# Patient Record
Sex: FEMALE | Race: BLACK OR AFRICAN AMERICAN | NOT HISPANIC OR LATINO | Employment: OTHER | URBAN - METROPOLITAN AREA
[De-identification: names, ages, dates, MRNs, and addresses within clinical notes are randomized per-mention and may not be internally consistent; named-entity substitution may affect disease eponyms.]

---

## 2020-05-25 LAB — EXT SARS-COV-2: NOT DETECTED

## 2020-08-06 ENCOUNTER — OFFICE VISIT (OUTPATIENT)
Dept: OBGYN CLINIC | Facility: CLINIC | Age: 85
End: 2020-08-06
Payer: MEDICARE

## 2020-08-06 VITALS
SYSTOLIC BLOOD PRESSURE: 147 MMHG | DIASTOLIC BLOOD PRESSURE: 73 MMHG | HEIGHT: 67 IN | TEMPERATURE: 96.8 F | HEART RATE: 67 BPM

## 2020-08-06 DIAGNOSIS — M22.2X2 PATELLOFEMORAL SYNDROME OF BOTH KNEES: ICD-10-CM

## 2020-08-06 DIAGNOSIS — M22.2X1 PATELLOFEMORAL SYNDROME OF BOTH KNEES: ICD-10-CM

## 2020-08-06 DIAGNOSIS — M17.0 PRIMARY OSTEOARTHRITIS OF BOTH KNEES: Primary | ICD-10-CM

## 2020-08-06 DIAGNOSIS — R29.898 WEAKNESS OF BOTH HIPS: ICD-10-CM

## 2020-08-06 PROCEDURE — 99204 OFFICE O/P NEW MOD 45 MIN: CPT | Performed by: FAMILY MEDICINE

## 2020-08-06 PROCEDURE — 20610 DRAIN/INJ JOINT/BURSA W/O US: CPT | Performed by: FAMILY MEDICINE

## 2020-08-06 RX ORDER — LIDOCAINE HYDROCHLORIDE 10 MG/ML
2 INJECTION, SOLUTION INFILTRATION; PERINEURAL
Status: COMPLETED | OUTPATIENT
Start: 2020-08-06 | End: 2020-08-06

## 2020-08-06 RX ORDER — LEVOTHYROXINE SODIUM 0.07 MG/1
75 TABLET ORAL
COMMUNITY
Start: 2020-07-11 | End: 2022-05-28

## 2020-08-06 RX ORDER — RISEDRONATE SODIUM 35 MG/1
35 TABLET, FILM COATED ORAL
COMMUNITY
Start: 2020-07-17

## 2020-08-06 RX ORDER — LOVASTATIN 20 MG/1
20 TABLET ORAL
COMMUNITY
Start: 2020-07-11 | End: 2022-05-28

## 2020-08-06 RX ORDER — AMLODIPINE BESYLATE 10 MG/1
10 TABLET ORAL
COMMUNITY
Start: 2020-07-23

## 2020-08-06 RX ORDER — TRIAMCINOLONE ACETONIDE 40 MG/ML
40 INJECTION, SUSPENSION INTRA-ARTICULAR; INTRAMUSCULAR
Status: COMPLETED | OUTPATIENT
Start: 2020-08-06 | End: 2020-08-06

## 2020-08-06 RX ORDER — LABETALOL 100 MG/1
0.5 TABLET, FILM COATED ORAL
Status: ON HOLD | COMMUNITY
Start: 2020-07-11 | End: 2022-05-28 | Stop reason: SDUPTHER

## 2020-08-06 RX ORDER — LIDOCAINE HYDROCHLORIDE 10 MG/ML
4 INJECTION, SOLUTION INFILTRATION; PERINEURAL
Status: COMPLETED | OUTPATIENT
Start: 2020-08-06 | End: 2020-08-06

## 2020-08-06 RX ORDER — MIRABEGRON 25 MG/1
25 TABLET, FILM COATED, EXTENDED RELEASE ORAL
COMMUNITY
Start: 2020-07-27

## 2020-08-06 RX ORDER — FOLIC ACID 1 MG/1
1 TABLET ORAL
COMMUNITY
Start: 2020-07-23

## 2020-08-06 RX ORDER — TRAMADOL HYDROCHLORIDE 50 MG/1
50 TABLET ORAL
Status: ON HOLD | COMMUNITY
Start: 2020-07-20 | End: 2022-05-28 | Stop reason: SDUPTHER

## 2020-08-06 RX ORDER — MELOXICAM 7.5 MG/1
7.5 TABLET ORAL
COMMUNITY
Start: 2020-07-23

## 2020-08-06 RX ORDER — QUINAPRIL 40 MG/1
40 TABLET ORAL
COMMUNITY
Start: 2020-07-23

## 2020-08-06 RX ADMIN — LIDOCAINE HYDROCHLORIDE 2 ML: 10 INJECTION, SOLUTION INFILTRATION; PERINEURAL at 14:01

## 2020-08-06 RX ADMIN — LIDOCAINE HYDROCHLORIDE 4 ML: 10 INJECTION, SOLUTION INFILTRATION; PERINEURAL at 14:01

## 2020-08-06 RX ADMIN — TRIAMCINOLONE ACETONIDE 40 MG: 40 INJECTION, SUSPENSION INTRA-ARTICULAR; INTRAMUSCULAR at 14:01

## 2020-08-06 NOTE — LETTER
August 6, 2020     [facility] [address]    Patient: Kristyn Stevens   YOB: 1933   Date of Visit: 8/6/2020       Dear Dr Mccollum Ba:    Thank you for referring Kristyn Stevens to me for evaluation  Below are my notes for this consultation  If you have questions, please do not hesitate to call me  I look forward to following your patient along with you  Sincerely,        [employer], DO        CC: No Recipients  [employer], DO  8/6/2020  5:10 PM  Sign when Signing Visit  Assessment/Plan:  Assessment/Plan   Diagnoses and all orders for this visit:    Primary osteoarthritis of both knees  -     Large joint arthrocentesis: bilateral knee    Patellofemoral syndrome of both knees    Weakness of both hips    Other orders  -     amLODIPine (NORVASC) 10 mg tablet; 10 mg  -     Calcium Carbonate-Vitamin D 600-400 MG-UNIT per tablet; 600 tablets  -     folic acid (FOLVITE) 1 mg tablet; 1 mg  -     labetalol (NORMODYNE) 100 mg tablet; 0 5 mg  -     levothyroxine 75 mcg tablet; 75 mcg  -     lovastatin (MEVACOR) 20 mg tablet; 20 mg  -     meloxicam (MOBIC) 7 5 mg tablet; 7 5 mg  -     Myrbetriq 25 MG TB24; 25 mg  -     quinapril (ACCUPRIL) 40 MG tablet; 40 mg  -     risedronate (ACTONEL) 35 mg tablet; 35 mg  -     traMADol (ULTRAM) 50 mg tablet; 48mg      80year-old female with osteoarthritis of both knees with bilateral knee pain more than 5 years duration  Discussed with patient and accompanying aid physical exam, radiographs, impression and plan  X-rays of both knees noted for severe tricompartmental osteoarthritis  Physical exam noted for joint hypertrophy both knees  She has genu valgum deformity  There is mild tenderness of the medial and lateral joint lines  She has extension limited to -20° and flexion to 90° both knees  Clinical impression that she is symptomatic from osteoarthritis    She has been on meloxicam 7 5 mg once daily and also doing formal physical therapy  I discussed treatment regimen of corticosteroid injection to which she agreed  I administered mixture of 4 cc 1% lidocaine and 1 cc Kenalog to each knee without complication  I recommend she continue with physical therapy and home exercises  She will follow up with me in 1 month via virtual visit  If no improvement with corticosteroid injections we will try viscosupplementation  Subjective:   Patient ID: Lesley Sagastume is a 80 y o  female  Chief Complaint   Patient presents with    Left Knee - Pain    Right Knee - Pain       26-year-old female with osteoarthritis of both knees is accompanied by aide from [facility] for evaluation of bilateral knee pain of more than 5 years duration  He reports having pain described as generalized to the knees but worse on the right, intermittent, nonradiating, worse with bending and extending the leg, worse with standing and ambulating, associated with swelling, and improved rest   She reports being unable to stand or ambulate due to pain  She has been in wheelchair for few years  She does attend physical therapy at her facility  She has been on meloxicam 7 5 mg once daily  Knee Pain   This is a chronic problem  The current episode started more than 1 year ago  The problem occurs intermittently  The problem has been unchanged  Associated symptoms include arthralgias, joint swelling, numbness and weakness  Pertinent negatives include no abdominal pain, chest pain, chills, fever, rash or sore throat  The symptoms are aggravated by standing and walking  She has tried rest, NSAIDs and acetaminophen (Physical therapy) for the symptoms  The treatment provided mild relief  The following portions of the patient's history were reviewed and updated as appropriate: She  has a past medical history of Hemiplegia ([address] ), Hyperlipidemia, Hypothyroidism, Kidney disorder, Osteoarthritis, Schizophrenia ([address] ), and Vascular disease    She has a past surgical history that includes Cataract extraction and Fracture surgery  Her family history includes No Known Problems in her father and mother  She  has no history on file for tobacco, alcohol, and drug  She has No Known Allergies       Review of Systems   Constitutional: Negative for chills and fever  HENT: Negative for sore throat  Eyes: Negative for visual disturbance  Respiratory: Negative for shortness of breath  Cardiovascular: Negative for chest pain  Gastrointestinal: Negative for abdominal pain  Genitourinary: Negative for flank pain  Musculoskeletal: Positive for arthralgias and joint swelling  Skin: Negative for rash and wound  Neurological: Positive for weakness and numbness  Hematological: Does not bruise/bleed easily  Psychiatric/Behavioral: Negative for self-injury  Objective:  Vitals:    08/06/20 1329   BP: 147/73   Pulse: 67   Temp: (!) 96 8 °F (36 °C)   Height: 5' 6 5" (1 689 m)     Right Knee Exam     Tenderness   The patient is experiencing tenderness in the lateral joint line and medial joint line  Range of Motion   Extension: -20   Flexion: 90     Other   Swelling: moderate    Comments:  Crepitus      Left Knee Exam     Tenderness   The patient is experiencing tenderness in the lateral joint line and medial joint line  Range of Motion   Extension: -20   Flexion: 100     Other   Swelling: moderate    Comments:  Crepitus      Right Hip Exam     Muscle Strength   Abduction: 4/5   Flexion: 4/5       Left Hip Exam     Muscle Strength   Abduction: 4/5   Flexion: 4/5       Back Exam     Muscle Strength   Right Quadriceps:  4/5   Left Quadriceps:  4/5             Physical Exam   Constitutional: She appears well-developed  Non-toxic appearance  No distress  HENT:   Head: Normocephalic  Mouth/Throat: Mucous membranes are moist    Eyes: Conjunctivae are normal    Neck: No tracheal deviation present  Cardiovascular: Normal rate     Pulmonary/Chest: Effort normal  No respiratory distress  Abdominal: She exhibits no distension  Musculoskeletal:         General: Swelling, tenderness and deformity present  No signs of injury  Neurological: She is alert  She displays weakness  A sensory deficit is present  Coordination normal    Skin: Skin is warm and dry  No bruising noted  She is not diaphoretic  No erythema  Psychiatric: Her behavior is normal    Nursing note and vitals reviewed  I have personally reviewed pertinent films in PACS and my interpretation is Severe tricompartmental osteoarthritis of both knees      Large joint arthrocentesis: bilateral knee  Date/Time: 8/6/2020 2:01 PM  Consent given by: patient  Site marked: site marked  Timeout: Immediately prior to procedure a time out was called to verify the correct patient, procedure, equipment, support staff and site/side marked as required   Supporting Documentation  Indications: pain   Procedure Details  Location: knee - bilateral knee  Preparation: Patient was prepped and draped in the usual sterile fashion  Needle size: 22 G  Ultrasound guidance: no  Approach: anterolateral    Medications (Right): 2 mL lidocaine 1 %; 4 mL lidocaine 1 %; 40 mg triamcinolone acetonide 40 mg/mLMedications (Left): 2 mL lidocaine 1 %; 4 mL lidocaine 1 %; 40 mg triamcinolone acetonide 40 mg/mL   Patient tolerance: patient tolerated the procedure well with no immediate complications  Dressing:  Sterile dressing applied

## 2020-08-06 NOTE — PROGRESS NOTES
Assessment/Plan:  Assessment/Plan   Diagnoses and all orders for this visit:    Primary osteoarthritis of both knees  -     Large joint arthrocentesis: bilateral knee    Patellofemoral syndrome of both knees    Weakness of both hips    Other orders  -     amLODIPine (NORVASC) 10 mg tablet; 10 mg  -     Calcium Carbonate-Vitamin D 600-400 MG-UNIT per tablet; 600 tablets  -     folic acid (FOLVITE) 1 mg tablet; 1 mg  -     labetalol (NORMODYNE) 100 mg tablet; 0 5 mg  -     levothyroxine 75 mcg tablet; 75 mcg  -     lovastatin (MEVACOR) 20 mg tablet; 20 mg  -     meloxicam (MOBIC) 7 5 mg tablet; 7 5 mg  -     Myrbetriq 25 MG TB24; 25 mg  -     quinapril (ACCUPRIL) 40 MG tablet; 40 mg  -     risedronate (ACTONEL) 35 mg tablet; 35 mg  -     traMADol (ULTRAM) 50 mg tablet; 48mg      80year-old female with osteoarthritis of both knees with bilateral knee pain more than 5 years duration  Discussed with patient and accompanying aid physical exam, radiographs, impression and plan  X-rays of both knees noted for severe tricompartmental osteoarthritis  Physical exam noted for joint hypertrophy both knees  She has genu valgum deformity  There is mild tenderness of the medial and lateral joint lines  She has extension limited to -20° and flexion to 90° both knees  Clinical impression that she is symptomatic from osteoarthritis  She has been on meloxicam 7 5 mg once daily and also doing formal physical therapy  I discussed treatment regimen of corticosteroid injection to which she agreed  I administered mixture of 4 cc 1% lidocaine and 1 cc Kenalog to each knee without complication  I recommend she continue with physical therapy and home exercises  She will follow up with me in 1 month via virtual visit  If no improvement with corticosteroid injections we will try viscosupplementation  Subjective:   Patient ID: Adrián Panda is a 80 y o  female    Chief Complaint   Patient presents with    Left Knee - Pain    Right Knee - Pain       5-year-old female with osteoarthritis of both knees is accompanied by aide from [facility] for evaluation of bilateral knee pain of more than 5 years duration  He reports having pain described as generalized to the knees but worse on the right, intermittent, nonradiating, worse with bending and extending the leg, worse with standing and ambulating, associated with swelling, and improved rest   She reports being unable to stand or ambulate due to pain  She has been in wheelchair for few years  She does attend physical therapy at her facility  She has been on meloxicam 7 5 mg once daily  Knee Pain   This is a chronic problem  The current episode started more than 1 year ago  The problem occurs intermittently  The problem has been unchanged  Associated symptoms include arthralgias, joint swelling, numbness and weakness  Pertinent negatives include no abdominal pain, chest pain, chills, fever, rash or sore throat  The symptoms are aggravated by standing and walking  She has tried rest, NSAIDs and acetaminophen (Physical therapy) for the symptoms  The treatment provided mild relief  The following portions of the patient's history were reviewed and updated as appropriate: She  has a past medical history of Hemiplegia ([address] ), Hyperlipidemia, Hypothyroidism, Kidney disorder, Osteoarthritis, Schizophrenia ([address] ), and Vascular disease  She  has a past surgical history that includes Cataract extraction and Fracture surgery  Her family history includes No Known Problems in her father and mother  She  has no history on file for tobacco, alcohol, and drug  She has No Known Allergies       Review of Systems   Constitutional: Negative for chills and fever  HENT: Negative for sore throat  Eyes: Negative for visual disturbance  Respiratory: Negative for shortness of breath  Cardiovascular: Negative for chest pain  Gastrointestinal: Negative for abdominal pain  Genitourinary: Negative for flank pain  Musculoskeletal: Positive for arthralgias and joint swelling  Skin: Negative for rash and wound  Neurological: Positive for weakness and numbness  Hematological: Does not bruise/bleed easily  Psychiatric/Behavioral: Negative for self-injury  Objective:  Vitals:    08/06/20 1329   BP: 147/73   Pulse: 67   Temp: (!) 96 8 °F (36 °C)   Height: 5' 6 5" (1 689 m)     Right Knee Exam     Tenderness   The patient is experiencing tenderness in the lateral joint line and medial joint line  Range of Motion   Extension: -20   Flexion: 90     Other   Swelling: moderate    Comments:  Crepitus      Left Knee Exam     Tenderness   The patient is experiencing tenderness in the lateral joint line and medial joint line  Range of Motion   Extension: -20   Flexion: 100     Other   Swelling: moderate    Comments:  Crepitus      Right Hip Exam     Muscle Strength   Abduction: 4/5   Flexion: 4/5       Left Hip Exam     Muscle Strength   Abduction: 4/5   Flexion: 4/5       Back Exam     Muscle Strength   Right Quadriceps:  4/5   Left Quadriceps:  4/5             Physical Exam   Constitutional: She appears well-developed  Non-toxic appearance  No distress  HENT:   Head: Normocephalic  Mouth/Throat: Mucous membranes are moist    Eyes: Conjunctivae are normal    Neck: No tracheal deviation present  Cardiovascular: Normal rate  Pulmonary/Chest: Effort normal  No respiratory distress  Abdominal: She exhibits no distension  Musculoskeletal:         General: Swelling, tenderness and deformity present  No signs of injury  Neurological: She is alert  She displays weakness  A sensory deficit is present  Coordination normal    Skin: Skin is warm and dry  No bruising noted  She is not diaphoretic  No erythema  Psychiatric: Her behavior is normal    Nursing note and vitals reviewed        I have personally reviewed pertinent films in PACS and my interpretation is Severe tricompartmental osteoarthritis of both knees      Large joint arthrocentesis: bilateral knee  Date/Time: 8/6/2020 2:01 PM  Consent given by: patient  Site marked: site marked  Timeout: Immediately prior to procedure a time out was called to verify the correct patient, procedure, equipment, support staff and site/side marked as required   Supporting Documentation  Indications: pain   Procedure Details  Location: knee - bilateral knee  Preparation: Patient was prepped and draped in the usual sterile fashion  Needle size: 22 G  Ultrasound guidance: no  Approach: anterolateral    Medications (Right): 2 mL lidocaine 1 %; 4 mL lidocaine 1 %; 40 mg triamcinolone acetonide 40 mg/mLMedications (Left): 2 mL lidocaine 1 %; 4 mL lidocaine 1 %; 40 mg triamcinolone acetonide 40 mg/mL   Patient tolerance: patient tolerated the procedure well with no immediate complications  Dressing:  Sterile dressing applied

## 2020-09-23 ENCOUNTER — OFFICE VISIT (OUTPATIENT)
Dept: OBGYN CLINIC | Facility: CLINIC | Age: 85
End: 2020-09-23
Payer: MEDICARE

## 2020-09-23 VITALS
WEIGHT: 172 LBS | BODY MASS INDEX: 27 KG/M2 | DIASTOLIC BLOOD PRESSURE: 71 MMHG | HEIGHT: 67 IN | SYSTOLIC BLOOD PRESSURE: 155 MMHG | HEART RATE: 66 BPM | TEMPERATURE: 97.8 F

## 2020-09-23 DIAGNOSIS — M17.0 PRIMARY OSTEOARTHRITIS OF BOTH KNEES: Primary | ICD-10-CM

## 2020-09-23 PROCEDURE — 99213 OFFICE O/P EST LOW 20 MIN: CPT | Performed by: FAMILY MEDICINE

## 2020-09-23 RX ORDER — MELOXICAM 15 MG/1
15 TABLET ORAL DAILY
Qty: 30 TABLET | Refills: 2 | Status: SHIPPED | OUTPATIENT
Start: 2020-09-23

## 2020-09-23 NOTE — PROGRESS NOTES
Assessment/Plan:  Assessment/Plan   Diagnoses and all orders for this visit:    Primary osteoarthritis of both knees  -     Injection procedure prior authorization; Future  -     meloxicam (MOBIC) 15 mg tablet; Take 1 tablet (15 mg total) by mouth daily         80year-old female osteoarthritis of both knees with bilateral knee pain of more than 5 years duration  Discussed with patient  And accompanying aid physical exam, impression, plan  Physical exam is noted for joint hypertrophy changes of both knees  She has mild tenderness at the medial and lateral joint lines of both knees  She has range of motion limited extension of -10 and flexion to 90 bilaterally  There is palpable crepitus with range of motion  Clinical impression is that she is symptomatic from osteoarthritis  She has not improved despite physical therapy, corticosteroid injection, and being on prescribed anti-inflammatory  I discussed treatment in the form of viscosupplementation to which she agreed  We will submit request for one-shot Visco supplement for each knee  She is advised to continue with physical therapy home exercises  She is also to increase meloxicam 7 5 mg once daily to meloxicam 15 mg once daily with food  She will return for injections once approved  Subjective:   Patient ID: Emily Lo is a 80 y o  female  Chief Complaint   Patient presents with    Left Knee - Pain    Right Knee - Pain        27-year-old female with osteoarthritis of both knees following up for bilateral knee pain of more than 5 years duration  She was last seen by me more than 6 weeks ago which point she received corticosteroid injection to each knee  She reports that corticosteroid injections provided mild temporary improvement in her pain    She reports still having pain described as generalized to the knees, intermittent, achy and sometimes sharp, nonradiating, worse with standing and ambulating, associated with limited range of motion, and improved with resting  She spends most of her time in the wheelchair  She has been having physical therapy on a daily basis  Her aid does report that she is able to stand with the aid of a walker to self transfer  She has been taking meloxicam 7 5 mg once daily  Knee Pain   This is a chronic problem  The current episode started more than 1 year ago  The problem occurs daily  The problem has been unchanged  Associated symptoms include arthralgias and joint swelling  Pertinent negatives include no numbness or weakness  The symptoms are aggravated by standing and walking  She has tried rest and NSAIDs (  Physical therapy, corticosteroid injection) for the symptoms  The treatment provided mild relief  Review of Systems   Musculoskeletal: Positive for arthralgias and joint swelling  Neurological: Negative for weakness and numbness  Objective:  Vitals:    09/23/20 0830   BP: 155/71   Pulse: 66   Temp: 97 8 °F (36 6 °C)   Weight: 78 kg (172 lb)   Height: 5' 6 5" (1 689 m)     Right Knee Exam     Tenderness   The patient is experiencing tenderness in the medial joint line  Range of Motion   Extension: -10   Flexion: 90     Other   Swelling: mild      Left Knee Exam     Tenderness   The patient is experiencing tenderness in the medial joint line  Range of Motion   Extension: -10   Flexion: 90     Other   Swelling: mild      Right Hip Exam     Muscle Strength   Flexion: 4/5       Left Hip Exam     Muscle Strength   Flexion: 4/5             Physical Exam  Vitals signs and nursing note reviewed  Constitutional:       General: She is not in acute distress  Appearance: She is well-developed  HENT:      Head: Normocephalic  Eyes:      Conjunctiva/sclera: Conjunctivae normal    Neck:      Trachea: No tracheal deviation  Cardiovascular:      Rate and Rhythm: Normal rate  Pulmonary:      Effort: Pulmonary effort is normal  No respiratory distress     Abdominal:      General: There is no distension  Musculoskeletal:         General: Swelling and tenderness present  Skin:     General: Skin is warm and dry  Neurological:      Mental Status: She is alert  Mental status is at baseline     Psychiatric:         Behavior: Behavior normal

## 2020-11-03 ENCOUNTER — OFFICE VISIT (OUTPATIENT)
Dept: OBGYN CLINIC | Facility: CLINIC | Age: 85
End: 2020-11-03
Payer: MEDICARE

## 2020-11-03 VITALS
HEIGHT: 67 IN | SYSTOLIC BLOOD PRESSURE: 136 MMHG | BODY MASS INDEX: 29.03 KG/M2 | DIASTOLIC BLOOD PRESSURE: 78 MMHG | TEMPERATURE: 98.7 F | WEIGHT: 185 LBS | HEART RATE: 80 BPM

## 2020-11-03 DIAGNOSIS — M17.0 PRIMARY OSTEOARTHRITIS OF BOTH KNEES: Primary | ICD-10-CM

## 2020-11-03 PROCEDURE — 20610 DRAIN/INJ JOINT/BURSA W/O US: CPT | Performed by: FAMILY MEDICINE

## 2020-11-03 RX ORDER — SERTRALINE HYDROCHLORIDE 25 MG/1
25 TABLET, FILM COATED ORAL DAILY
COMMUNITY
End: 2022-05-28

## 2020-11-03 RX ORDER — LIDOCAINE HYDROCHLORIDE 10 MG/ML
3 INJECTION, SOLUTION INFILTRATION; PERINEURAL
Status: COMPLETED | OUTPATIENT
Start: 2020-11-03 | End: 2020-11-03

## 2020-11-03 RX ADMIN — LIDOCAINE HYDROCHLORIDE 3 ML: 10 INJECTION, SOLUTION INFILTRATION; PERINEURAL at 13:50

## 2021-07-29 ENCOUNTER — HOSPITAL ENCOUNTER (OUTPATIENT)
Dept: NON INVASIVE DIAGNOSTICS | Facility: HOSPITAL | Age: 86
Discharge: HOME | End: 2021-07-29
Payer: MEDICARE

## 2021-07-29 DIAGNOSIS — M54.81 OCCIPITAL NEURALGIA, UNSPECIFIED LATERALITY: ICD-10-CM

## 2021-07-29 PROCEDURE — 93886 INTRACRANIAL COMPLETE STUDY: CPT | Performed by: SURGERY

## 2021-07-29 PROCEDURE — 93882 EXTRACRANIAL UNI/LTD STUDY: CPT

## 2021-10-09 DIAGNOSIS — R10.2 PAIN IN PELVIS: Primary | ICD-10-CM

## 2021-10-12 ENCOUNTER — APPOINTMENT (OUTPATIENT)
Dept: RADIOLOGY | Facility: CLINIC | Age: 86
End: 2021-10-12
Payer: MEDICARE

## 2021-10-12 ENCOUNTER — OFFICE VISIT (OUTPATIENT)
Dept: OBGYN CLINIC | Facility: CLINIC | Age: 86
End: 2021-10-12
Payer: MEDICARE

## 2021-10-12 VITALS
DIASTOLIC BLOOD PRESSURE: 81 MMHG | HEART RATE: 76 BPM | SYSTOLIC BLOOD PRESSURE: 146 MMHG | WEIGHT: 185 LBS | BODY MASS INDEX: 29.03 KG/M2 | HEIGHT: 67 IN

## 2021-10-12 DIAGNOSIS — R10.2 PAIN IN PELVIS: ICD-10-CM

## 2021-10-12 DIAGNOSIS — S32.82XD MULTIPLE CLOSED STABLE LATERAL COMPRESSION FRACTURES OF PELVIS WITH ROUTINE HEALING, SUBSEQUENT ENCOUNTER: Primary | ICD-10-CM

## 2021-10-12 PROCEDURE — 99213 OFFICE O/P EST LOW 20 MIN: CPT | Performed by: ORTHOPAEDIC SURGERY

## 2021-10-12 PROCEDURE — 72190 X-RAY EXAM OF PELVIS: CPT

## 2021-10-13 PROBLEM — S32.82XA: Status: ACTIVE | Noted: 2021-10-13

## 2021-12-06 ENCOUNTER — HOSPITAL ENCOUNTER (OUTPATIENT)
Dept: MRI IMAGING | Facility: CLINIC | Age: 86
Discharge: HOME | End: 2021-12-06

## 2021-12-06 DIAGNOSIS — M79.2 NEURALGIA: ICD-10-CM

## 2021-12-22 ENCOUNTER — HOSPITAL ENCOUNTER (OUTPATIENT)
Dept: MRI IMAGING | Facility: HOSPITAL | Age: 86
Discharge: HOME | End: 2021-12-22
Attending: PSYCHIATRY & NEUROLOGY
Payer: MEDICARE

## 2021-12-22 PROCEDURE — A9585 GADOBUTROL INJECTION: HCPCS | Performed by: PSYCHIATRY & NEUROLOGY

## 2021-12-22 PROCEDURE — 70553 MRI BRAIN STEM W/O & W/DYE: CPT

## 2021-12-22 RX ADMIN — GADOBUTROL 8 ML: 604.72 INJECTION INTRAVENOUS at 19:04

## 2022-01-10 NOTE — PROGRESS NOTES
Assessment and Plan:   Patient is an 75-year-old female who presents for rheumatology consult regarding concern for giant cell arteritis  Patient is unable to provide much history today and provides very limited information as to why she is coming in today  Chart review reveals that about 6 months ago there was concern for giant cell arteritis as she was complaining of neck pain and pain in her temples  At that time she also did have a sed rate of 99 but with a normal CRP  She did subsequently have temporal artery Doppler done which was normal   Today she reports that she was having pain in her temples along with neck pain that is most prominent on the right side at this time  Review of systems is otherwise negative including for vision changes such as sudden vision loss or symptoms suggestive of jaw claudication  I discussed with her and the staff member present with her that the next step would be to repeat these inflammatory markers at this time along with some other rheumatologic labs  If her sed rate is still markedly elevated then the next step would be temporal artery biopsy  I did mention this to her and she seemed to understand  However we will obtain the labs 1st and determine next steps from there      Plan:  Diagnoses and all orders for this visit:    Elevated sed rate  -     Quantiferon TB Gold Plus  -     Chronic Hepatitis Panel  -     C-reactive protein  -     Sedimentation rate, automated  -     RF Screen w/ Reflex to Titer  -     Cyclic citrul peptide antibody, IgG  -     CBC    Temple tenderness  -     Quantiferon TB Gold Plus  -     Chronic Hepatitis Panel  -     C-reactive protein  -     Sedimentation rate, automated  -     RF Screen w/ Reflex to Titer  -     Cyclic citrul peptide antibody, IgG  -     CBC    Encounter for screening for other viral diseases   -     Chronic Hepatitis Panel    Other orders  -     alendronate (FOSAMAX) 70 mg tablet; alendronate 70 mg tablet  -     clopidogrel (PLAVIX) 75 mg tablet  -     Diclofenac Sodium (Voltaren) 1 %; Voltaren 1 % topical gel  -     FeroSul 325 (65 Fe) MG tablet  -     gabapentin (NEURONTIN) 100 mg capsule  -     gabapentin (NEURONTIN) 300 mg capsule  -     OLANZapine (ZyPREXA) 10 mg tablet; olanzapine 10 mg tablet  -     pravastatin (PRAVACHOL) 20 mg tablet; pravastatin 20 mg tablet  -     predniSONE 1 mg tablet  -     predniSONE 5 mg tablet        Follow-up plan: depending on work-up      HPI  Pavan Weir is a 80 y o   female with hypertension, hyperlipidemia, hypothyroidism, osteoporosis, depression, schizophrenia, h/o CVA with hemiplegia, OA, L inferior and superior pubic ramus fracture 9/2021, who presents for rheumatology consult by request of Dr Tye Sandifer for scalp tenderness, concern for GCA  Patient presents today from [facility]  She presents with 1 of the staff members  Patient is not entirely sure why she is here today and provides very limited information and history  Per chart review, there was concern about 6 months ago for giant cell arteritis as patient was complaining of headaches and neck pain  At that time, her sed rate was 99 and her CRP was normal at 4 8  She subsequently had a temporal artery Doppler done which did not show any obvious concerns for giant cell arteritis  Although they noted it was a technically difficult study  Since then I do not see any evidence of a temporal artery biopsy and patient does not believe she had this done  She does have prednisone on her medication list from the nursing home however the dosing is not clear and it is not clear when this was started  Patient reports she does have neck pain most prominent currently on the right side of the neck  She also reports that for several months she has had pain on the left side of the head and neck and in the temples bilaterally  She reports she does not have a headache but just tenderness in the temples    She denies any vision changes such as acute vision loss  She denies any jaw pain while chewing her food  She has the neck pain and stiffness but otherwise denies any peripheral joint pain in the arms and legs  Review of Systems  Review of Systems   Constitutional: Negative for fatigue and fever  HENT: Negative for mouth sores  Eyes: Negative for pain and visual disturbance  Respiratory: Negative for shortness of breath  Cardiovascular: Negative for leg swelling  Gastrointestinal: Negative for abdominal pain  Musculoskeletal: Positive for arthralgias, neck pain and neck stiffness  Negative for joint swelling  Skin: Negative for rash  Neurological: Positive for weakness  Psychiatric/Behavioral: Negative for sleep disturbance         Allergies  No Known Allergies    Home Medications    Current Outpatient Medications:     alendronate (FOSAMAX) 70 mg tablet, alendronate 70 mg tablet, Disp: , Rfl:     amLODIPine (NORVASC) 10 mg tablet, 10 mg, Disp: , Rfl:     Calcium Carbonate-Vitamin D 600-400 MG-UNIT per tablet, 600 tablets, Disp: , Rfl:     clopidogrel (PLAVIX) 75 mg tablet, , Disp: , Rfl:     Diclofenac Sodium (Voltaren) 1 %, Voltaren 1 % topical gel, Disp: , Rfl:     FeroSul 325 (65 Fe) MG tablet, , Disp: , Rfl:     folic acid (FOLVITE) 1 mg tablet, 1 mg, Disp: , Rfl:     gabapentin (NEURONTIN) 100 mg capsule, , Disp: , Rfl:     gabapentin (NEURONTIN) 300 mg capsule, , Disp: , Rfl:     labetalol (NORMODYNE) 100 mg tablet, 0 5 mg, Disp: , Rfl:     levothyroxine 75 mcg tablet, 75 mcg, Disp: , Rfl:     lovastatin (MEVACOR) 20 mg tablet, 20 mg, Disp: , Rfl:     meloxicam (MOBIC) 15 mg tablet, Take 1 tablet (15 mg total) by mouth daily, Disp: 30 tablet, Rfl: 2    meloxicam (MOBIC) 7 5 mg tablet, 7 5 mg, Disp: , Rfl:     Myrbetriq 25 MG TB24, 25 mg, Disp: , Rfl:     OLANZapine (ZyPREXA) 10 mg tablet, olanzapine 10 mg tablet, Disp: , Rfl:     pravastatin (PRAVACHOL) 20 mg tablet, pravastatin 20 mg tablet, Disp: , Rfl:     predniSONE 1 mg tablet, , Disp: , Rfl:     predniSONE 5 mg tablet, , Disp: , Rfl:     quinapril (ACCUPRIL) 40 MG tablet, 40 mg, Disp: , Rfl:     risedronate (ACTONEL) 35 mg tablet, 35 mg, Disp: , Rfl:     sertraline (ZOLOFT) 25 mg tablet, Take 25 mg by mouth daily, Disp: , Rfl:     traMADol (ULTRAM) 50 mg tablet, 50 mg, Disp: , Rfl:     Past Medical History  Past Medical History:   Diagnosis Date    [facility]     Hyperlipidemia     Hypothyroidism     Kidney disorder     Osteoarthritis     [facility]     Vascular disease        Past Surgical History   Past Surgical History:   Procedure Laterality Date    CATARACT EXTRACTION      FRACTURE SURGERY         Family History  No known family history of autoimmune or inflammatory diseases  Family History   Problem Relation Age of Onset    No Known Problems Mother     No Known Problems Father        Social History  Occupation: lives at [employer]   Social History     Substance and Sexual Activity   Alcohol Use None     Social History     Substance and Sexual Activity   Drug Use Not on file     Social History     Tobacco Use   Smoking Status Never Smoker   Smokeless Tobacco Never Used       Objective:    Vitals:    01/13/22 0841   BP: 138/74   Pulse: 78       Physical Exam  Constitutional:       General: She is not in acute distress  HENT:      Head: Normocephalic and atraumatic  Jaw: No tenderness  Comments: No temporal tenderness B/L   Eyes:      Conjunctiva/sclera: Conjunctivae normal    Pulmonary:      Effort: Pulmonary effort is normal  No respiratory distress  Musculoskeletal:      Cervical back: Neck supple  Comments: No joint swelling or synovitis anywhere  No fixed rheumatoid deformities  Skin:     Coloration: Skin is not pale  Neurological:      Mental Status: She is alert  Mental status is at baseline     Psychiatric:         Mood and Affect: Mood normal          Behavior: Behavior normal          Imaging:   Temporal artery doppler 7/29/21:  CONCLUSION:     Impression     RIGHT SIDE:  There is no gross evidence of giant cell arteritis, aneurysm, or significant  stenosis noted in the superficial temporal artery and its branches      LEFT SIDE:  There is no gross evidence of giant cell arteritis, aneurysm, or significant  stenosis noted in the superficial temporal artery and its branches      Technically difficult and possibly unreliable secondary to continuous PT mouth  movement during evaluation      Technical findings faxed to chart      Labs:    Ref Range & Units 11/17/21  6:36 AM   Hemoglobin 11 5 - 14 5 g/dL 11 9    Hematocrit 35 0 - 43 0 % 36 4    WBC 4 0 - 10 0 thou/cmm 9 9    RBC 3 70 - 4 70 mill/cmm 4 18    Platelet Count 370 - 350 thou/cmm 206    MPV 7 5 - 11 3 fL 9 0    MCV 80 - 100 fL 87    MCH 26 0 - 34 0 pg 28 6    MCHC 32 0 - 37 0 g/dL 32 8    RDW 12 0 - 16 0 % 16 1 High        Ref Range & Units 11/17/21  6:36 AM   Glucose 70 - 100 mg/dL 78    BUN 7 - 25 mg/dL 30 High     Creatinine 0 60 - 1 20 mg/dL 0 81    Sodium 136 - 145 mmol/L 139    Potassium 3 5 - 5 1 mmol/L 4 3    Chloride 100 - 108 mmol/L 101    Carbon Dioxide 21 - 31 mmol/L 29    Calcium 8 6 - 10 2 mg/dL 9 1    Alkaline Phosphatase 34 - 104 U/L 53    Albumin 3 5 - 5 7 g/dL 4 2    Bilirubin, Total 0 2 - 1 1 mg/dL 0 4    Protein, Total 6 4 - 8 9 g/dL 7 0    AST 13 - 39 U/L 19    ALT 7 - 52 U/L 19    Anion Gap 4 - 18 9    eGFR, Non- >60 65    eGFR,  >60 75       Ref Range & Units 11/17/21  6:36 AM   Thyroid Stimulating Hormone 0 34 - 5 60 uIU/mL 4 95        Ref Range & Units 8/16/21  5:43 AM    Hemoglobin 11 5 - 14 5 g/dL 11 4Low           Hematocrit 35 0 - 43 0 % 33 7Low           WBC 4 0 - 10 0 thou/cmm 8 0          RBC 3 70 - 4 70 mill/cmm 3 92          Platelet Count 227 - 350 thou/cmm 200          MPV 7 5 - 11 3 fL 7 7          MCV 80 - 100 fL 86          MCH 26 0 - 34 0 pg 29 2       MCHC 32 0 - 37 0 g/dL 34 0          RDW 12 0 - 16 0 % 15 2         Ref Range & Units 8/16/21  5:43 AM   Thyroid Stimulating Hormone 0 34 - 5 60 uIU/mL 2 50         Ref Range & Units 8/16/21  5:43 AM   Glucose 70 - 100 mg/dL 100          BUN 7 - 25 mg/dL 31High           Creatinine 0 60 - 1 20 mg/dL 0 79          Sodium 136 - 145 mmol/L 139          Potassium 3 5 - 5 1 mmol/L 4 1          Chloride 100 - 108 mmol/L 100          Carbon Dioxide 21 - 31 mmol/L 29          Calcium 8 6 - 10 2 mg/dL 8 4Low           Alkaline Phosphatase 34 - 104 U/L 55          Albumin 3 5 - 5 7 g/dL 3 6          Bilirubin, Total 0 2 - 1 1 mg/dL 0 3          Protein, Total 6 4 - 8 9 g/dL 6 2Low           AST 13 - 39 U/L 24          ALT 7 - 52 U/L 33          Anion Gap 4 - 18  10          eGFR, Non- >60  67          eGFR,  >60  78         Ref Range & Units 6/25/21 12:00 AM   Sed Rate 0 - 30 mm/hr 81High          Ref Range & Units 6/23/21  6:35 AM   Sed Rate 0 - 30 mm/hr 99High          Ref Range & Units 6/23/21  6:35 AM   C-Reactive Protein <7 0 mg/L 4 8

## 2022-01-13 ENCOUNTER — OFFICE VISIT (OUTPATIENT)
Dept: RHEUMATOLOGY | Facility: CLINIC | Age: 87
End: 2022-01-13
Payer: MEDICARE

## 2022-01-13 VITALS — SYSTOLIC BLOOD PRESSURE: 138 MMHG | HEART RATE: 78 BPM | DIASTOLIC BLOOD PRESSURE: 74 MMHG

## 2022-01-13 DIAGNOSIS — R70.0 ELEVATED SED RATE: Primary | ICD-10-CM

## 2022-01-13 DIAGNOSIS — Z11.59 ENCOUNTER FOR SCREENING FOR OTHER VIRAL DISEASES: ICD-10-CM

## 2022-01-13 DIAGNOSIS — R51.9 TEMPLE TENDERNESS: ICD-10-CM

## 2022-01-13 PROCEDURE — 99205 OFFICE O/P NEW HI 60 MIN: CPT | Performed by: INTERNAL MEDICINE

## 2022-01-13 RX ORDER — ALENDRONATE SODIUM 70 MG/1
70 TABLET ORAL
COMMUNITY

## 2022-01-13 RX ORDER — CLOPIDOGREL BISULFATE 75 MG/1
75 TABLET ORAL DAILY
COMMUNITY
Start: 2022-01-05

## 2022-01-13 RX ORDER — PREDNISONE 1 MG/1
TABLET ORAL
COMMUNITY
Start: 2021-11-01 | End: 2022-05-28

## 2022-01-13 RX ORDER — PRAVASTATIN SODIUM 20 MG
TABLET ORAL
COMMUNITY

## 2022-01-13 RX ORDER — GABAPENTIN 100 MG/1
CAPSULE ORAL
COMMUNITY
Start: 2021-11-04 | End: 2022-05-28

## 2022-01-13 RX ORDER — OLANZAPINE 10 MG/1
TABLET ORAL
COMMUNITY
End: 2022-05-28

## 2022-01-13 RX ORDER — FERROUS SULFATE 325(65) MG
TABLET ORAL
COMMUNITY
Start: 2021-12-01

## 2022-01-13 RX ORDER — GABAPENTIN 300 MG/1
CAPSULE ORAL
COMMUNITY
Start: 2022-01-05 | End: 2022-05-28

## 2022-05-27 ENCOUNTER — APPOINTMENT (EMERGENCY)
Dept: CT IMAGING | Facility: HOSPITAL | Age: 87
DRG: 546 | End: 2022-05-27
Payer: MEDICARE

## 2022-05-27 ENCOUNTER — HOSPITAL ENCOUNTER (INPATIENT)
Facility: HOSPITAL | Age: 87
LOS: 1 days | Discharge: NURSING, REHAB OR LONG-TERM CARE | DRG: 546 | End: 2022-05-28
Attending: EMERGENCY MEDICINE | Admitting: INTERNAL MEDICINE
Payer: MEDICARE

## 2022-05-27 DIAGNOSIS — N32.81 OVERACTIVE BLADDER: ICD-10-CM

## 2022-05-27 DIAGNOSIS — M19.90 OSTEOARTHRITIS: ICD-10-CM

## 2022-05-27 DIAGNOSIS — I10 PRIMARY HYPERTENSION: ICD-10-CM

## 2022-05-27 DIAGNOSIS — F20.9 SCHIZOPHRENIA (HCC): ICD-10-CM

## 2022-05-27 DIAGNOSIS — G81.90 HEMIPLEGIA (HCC): ICD-10-CM

## 2022-05-27 DIAGNOSIS — K59.00 CONSTIPATED: ICD-10-CM

## 2022-05-27 DIAGNOSIS — M31.6 TEMPORAL ARTERITIS (HCC): Primary | ICD-10-CM

## 2022-05-27 DIAGNOSIS — E03.9 ACQUIRED HYPOTHYROIDISM: ICD-10-CM

## 2022-05-27 PROBLEM — E78.5 HYPERLIPIDEMIA: Status: ACTIVE | Noted: 2022-05-27

## 2022-05-27 PROBLEM — N28.9 KIDNEY DISORDER: Status: ACTIVE | Noted: 2022-05-27

## 2022-05-27 LAB
ANION GAP SERPL CALCULATED.3IONS-SCNC: 9 MMOL/L (ref 4–13)
APTT PPP: 33 SECONDS (ref 23–37)
BASOPHILS # BLD AUTO: 0.04 THOUSANDS/ΜL (ref 0–0.1)
BASOPHILS NFR BLD AUTO: 1 % (ref 0–1)
BUN SERPL-MCNC: 18 MG/DL (ref 5–25)
CALCIUM SERPL-MCNC: 9.2 MG/DL (ref 8.3–10.1)
CHLORIDE SERPL-SCNC: 99 MMOL/L (ref 100–108)
CO2 SERPL-SCNC: 31 MMOL/L (ref 21–32)
CREAT SERPL-MCNC: 0.88 MG/DL (ref 0.6–1.3)
CRP SERPL QL: 18.6 MG/L
EOSINOPHIL # BLD AUTO: 0.2 THOUSAND/ΜL (ref 0–0.61)
EOSINOPHIL NFR BLD AUTO: 3 % (ref 0–6)
ERYTHROCYTE [DISTWIDTH] IN BLOOD BY AUTOMATED COUNT: 15.3 % (ref 11.6–15.1)
ERYTHROCYTE [SEDIMENTATION RATE] IN BLOOD: 117 MM/HOUR (ref 0–29)
FLUAV RNA RESP QL NAA+PROBE: NEGATIVE
FLUBV RNA RESP QL NAA+PROBE: NEGATIVE
GFR SERPL CREATININE-BSD FRML MDRD: 58 ML/MIN/1.73SQ M
GLUCOSE SERPL-MCNC: 106 MG/DL (ref 65–140)
GLUCOSE SERPL-MCNC: 129 MG/DL (ref 65–140)
HCT VFR BLD AUTO: 39.6 % (ref 34.8–46.1)
HGB BLD-MCNC: 12.4 G/DL (ref 11.5–15.4)
IMM GRANULOCYTES # BLD AUTO: 0.03 THOUSAND/UL (ref 0–0.2)
IMM GRANULOCYTES NFR BLD AUTO: 0 % (ref 0–2)
INR PPP: 1.03 (ref 0.84–1.19)
LYMPHOCYTES # BLD AUTO: 1.91 THOUSANDS/ΜL (ref 0.6–4.47)
LYMPHOCYTES NFR BLD AUTO: 26 % (ref 14–44)
MCH RBC QN AUTO: 27.7 PG (ref 26.8–34.3)
MCHC RBC AUTO-ENTMCNC: 31.3 G/DL (ref 31.4–37.4)
MCV RBC AUTO: 88 FL (ref 82–98)
MONOCYTES # BLD AUTO: 0.66 THOUSAND/ΜL (ref 0.17–1.22)
MONOCYTES NFR BLD AUTO: 9 % (ref 4–12)
NEUTROPHILS # BLD AUTO: 4.66 THOUSANDS/ΜL (ref 1.85–7.62)
NEUTS SEG NFR BLD AUTO: 61 % (ref 43–75)
NRBC BLD AUTO-RTO: 0 /100 WBCS
PLATELET # BLD AUTO: 198 THOUSANDS/UL (ref 149–390)
PLATELET # BLD AUTO: 222 THOUSANDS/UL (ref 149–390)
PMV BLD AUTO: 10.3 FL (ref 8.9–12.7)
PMV BLD AUTO: 9.9 FL (ref 8.9–12.7)
POTASSIUM SERPL-SCNC: 3.7 MMOL/L (ref 3.5–5.3)
PROTHROMBIN TIME: 13.1 SECONDS (ref 11.6–14.5)
RBC # BLD AUTO: 4.48 MILLION/UL (ref 3.81–5.12)
RSV RNA RESP QL NAA+PROBE: NEGATIVE
SARS-COV-2 RNA RESP QL NAA+PROBE: NEGATIVE
SODIUM SERPL-SCNC: 139 MMOL/L (ref 136–145)
WBC # BLD AUTO: 7.5 THOUSAND/UL (ref 4.31–10.16)

## 2022-05-27 PROCEDURE — G1004 CDSM NDSC: HCPCS

## 2022-05-27 PROCEDURE — 85610 PROTHROMBIN TIME: CPT | Performed by: PHYSICIAN ASSISTANT

## 2022-05-27 PROCEDURE — 82948 REAGENT STRIP/BLOOD GLUCOSE: CPT

## 2022-05-27 PROCEDURE — 85049 AUTOMATED PLATELET COUNT: CPT | Performed by: INTERNAL MEDICINE

## 2022-05-27 PROCEDURE — 85730 THROMBOPLASTIN TIME PARTIAL: CPT | Performed by: PHYSICIAN ASSISTANT

## 2022-05-27 PROCEDURE — 96374 THER/PROPH/DIAG INJ IV PUSH: CPT

## 2022-05-27 PROCEDURE — 85652 RBC SED RATE AUTOMATED: CPT | Performed by: PHYSICIAN ASSISTANT

## 2022-05-27 PROCEDURE — 0241U HB NFCT DS VIR RESP RNA 4 TRGT: CPT | Performed by: PHYSICIAN ASSISTANT

## 2022-05-27 PROCEDURE — 80048 BASIC METABOLIC PNL TOTAL CA: CPT | Performed by: PHYSICIAN ASSISTANT

## 2022-05-27 PROCEDURE — 85025 COMPLETE CBC W/AUTO DIFF WBC: CPT | Performed by: PHYSICIAN ASSISTANT

## 2022-05-27 PROCEDURE — 86140 C-REACTIVE PROTEIN: CPT | Performed by: PHYSICIAN ASSISTANT

## 2022-05-27 PROCEDURE — 99223 1ST HOSP IP/OBS HIGH 75: CPT | Performed by: INTERNAL MEDICINE

## 2022-05-27 PROCEDURE — 99285 EMERGENCY DEPT VISIT HI MDM: CPT

## 2022-05-27 PROCEDURE — 99285 EMERGENCY DEPT VISIT HI MDM: CPT | Performed by: PHYSICIAN ASSISTANT

## 2022-05-27 PROCEDURE — 70450 CT HEAD/BRAIN W/O DYE: CPT

## 2022-05-27 PROCEDURE — 96375 TX/PRO/DX INJ NEW DRUG ADDON: CPT

## 2022-05-27 PROCEDURE — 36415 COLL VENOUS BLD VENIPUNCTURE: CPT | Performed by: PHYSICIAN ASSISTANT

## 2022-05-27 RX ORDER — FOLIC ACID 1 MG/1
1 TABLET ORAL DAILY
Status: DISCONTINUED | OUTPATIENT
Start: 2022-05-28 | End: 2022-05-29 | Stop reason: HOSPADM

## 2022-05-27 RX ORDER — SODIUM CHLORIDE 9 MG/ML
70 INJECTION, SOLUTION INTRAVENOUS CONTINUOUS
Status: DISCONTINUED | OUTPATIENT
Start: 2022-05-27 | End: 2022-05-28

## 2022-05-27 RX ORDER — HEPARIN SODIUM 5000 [USP'U]/ML
5000 INJECTION, SOLUTION INTRAVENOUS; SUBCUTANEOUS EVERY 12 HOURS SCHEDULED
Status: DISCONTINUED | OUTPATIENT
Start: 2022-05-27 | End: 2022-05-29 | Stop reason: HOSPADM

## 2022-05-27 RX ORDER — FENTANYL CITRATE 50 UG/ML
50 INJECTION, SOLUTION INTRAMUSCULAR; INTRAVENOUS ONCE
Status: COMPLETED | OUTPATIENT
Start: 2022-05-27 | End: 2022-05-27

## 2022-05-27 RX ORDER — PRAVASTATIN SODIUM 40 MG
40 TABLET ORAL
Status: DISCONTINUED | OUTPATIENT
Start: 2022-05-27 | End: 2022-05-29 | Stop reason: HOSPADM

## 2022-05-27 RX ORDER — SENNOSIDES 8.6 MG
1 TABLET ORAL
Status: DISCONTINUED | OUTPATIENT
Start: 2022-05-27 | End: 2022-05-29 | Stop reason: HOSPADM

## 2022-05-27 RX ORDER — OXYBUTYNIN CHLORIDE 5 MG/1
5 TABLET, EXTENDED RELEASE ORAL DAILY
Status: DISCONTINUED | OUTPATIENT
Start: 2022-05-28 | End: 2022-05-29 | Stop reason: HOSPADM

## 2022-05-27 RX ORDER — GABAPENTIN 300 MG/1
300 CAPSULE ORAL 3 TIMES DAILY
Status: DISCONTINUED | OUTPATIENT
Start: 2022-05-27 | End: 2022-05-28

## 2022-05-27 RX ORDER — ACETAMINOPHEN 325 MG/1
650 TABLET ORAL EVERY 6 HOURS PRN
Status: DISCONTINUED | OUTPATIENT
Start: 2022-05-27 | End: 2022-05-29 | Stop reason: HOSPADM

## 2022-05-27 RX ORDER — FERROUS SULFATE 325(65) MG
325 TABLET ORAL
Status: DISCONTINUED | OUTPATIENT
Start: 2022-05-28 | End: 2022-05-29 | Stop reason: HOSPADM

## 2022-05-27 RX ORDER — CLOPIDOGREL BISULFATE 75 MG/1
75 TABLET ORAL DAILY
Status: DISCONTINUED | OUTPATIENT
Start: 2022-05-28 | End: 2022-05-29 | Stop reason: HOSPADM

## 2022-05-27 RX ORDER — LEVOTHYROXINE SODIUM 0.1 MG/1
100 TABLET ORAL
Status: DISCONTINUED | OUTPATIENT
Start: 2022-05-28 | End: 2022-05-29 | Stop reason: HOSPADM

## 2022-05-27 RX ORDER — GABAPENTIN 300 MG/1
900 CAPSULE ORAL 3 TIMES DAILY
Status: DISCONTINUED | OUTPATIENT
Start: 2022-05-27 | End: 2022-05-27

## 2022-05-27 RX ORDER — PANTOPRAZOLE SODIUM 20 MG/1
20 TABLET, DELAYED RELEASE ORAL
Status: DISCONTINUED | OUTPATIENT
Start: 2022-05-28 | End: 2022-05-29 | Stop reason: HOSPADM

## 2022-05-27 RX ORDER — ONDANSETRON 2 MG/ML
4 INJECTION INTRAMUSCULAR; INTRAVENOUS ONCE
Status: COMPLETED | OUTPATIENT
Start: 2022-05-27 | End: 2022-05-27

## 2022-05-27 RX ORDER — INSULIN LISPRO 100 [IU]/ML
1-5 INJECTION, SOLUTION INTRAVENOUS; SUBCUTANEOUS
Status: DISCONTINUED | OUTPATIENT
Start: 2022-05-27 | End: 2022-05-29 | Stop reason: HOSPADM

## 2022-05-27 RX ORDER — ONDANSETRON 2 MG/ML
4 INJECTION INTRAMUSCULAR; INTRAVENOUS EVERY 6 HOURS PRN
Status: DISCONTINUED | OUTPATIENT
Start: 2022-05-27 | End: 2022-05-29 | Stop reason: HOSPADM

## 2022-05-27 RX ORDER — LABETALOL 100 MG/1
50 TABLET, FILM COATED ORAL DAILY
Status: DISCONTINUED | OUTPATIENT
Start: 2022-05-28 | End: 2022-05-29 | Stop reason: HOSPADM

## 2022-05-27 RX ORDER — TRAMADOL HYDROCHLORIDE 50 MG/1
50 TABLET ORAL EVERY 6 HOURS PRN
Status: DISCONTINUED | OUTPATIENT
Start: 2022-05-27 | End: 2022-05-29 | Stop reason: HOSPADM

## 2022-05-27 RX ORDER — B-COMPLEX WITH VITAMIN C
1 TABLET ORAL
Status: DISCONTINUED | OUTPATIENT
Start: 2022-05-28 | End: 2022-05-29 | Stop reason: HOSPADM

## 2022-05-27 RX ORDER — LISINOPRIL 20 MG/1
40 TABLET ORAL DAILY
Status: DISCONTINUED | OUTPATIENT
Start: 2022-05-28 | End: 2022-05-29 | Stop reason: HOSPADM

## 2022-05-27 RX ADMIN — GABAPENTIN 300 MG: 300 CAPSULE ORAL at 21:36

## 2022-05-27 RX ADMIN — FENTANYL CITRATE 50 MCG: 50 INJECTION, SOLUTION INTRAMUSCULAR; INTRAVENOUS at 12:04

## 2022-05-27 RX ADMIN — SENNOSIDES 8.6 MG: 8.6 TABLET, FILM COATED ORAL at 21:36

## 2022-05-27 RX ADMIN — SODIUM CHLORIDE 70 ML/HR: 0.9 INJECTION, SOLUTION INTRAVENOUS at 17:39

## 2022-05-27 RX ADMIN — SODIUM CHLORIDE 1000 MG: 9 INJECTION, SOLUTION INTRAVENOUS at 13:55

## 2022-05-27 RX ADMIN — TRAMADOL HYDROCHLORIDE 50 MG: 50 TABLET, COATED ORAL at 21:36

## 2022-05-27 RX ADMIN — ONDANSETRON 4 MG: 2 INJECTION INTRAMUSCULAR; INTRAVENOUS at 12:04

## 2022-05-27 RX ADMIN — HEPARIN SODIUM 5000 UNITS: 5000 INJECTION INTRAVENOUS; SUBCUTANEOUS at 21:36

## 2022-05-27 NOTE — ED NOTES
Pt c/o of left sided jaw pain, this RN offered the PRN Tylenol, pt declined  Pt repositioned onto right side to promote comfort  Pt states the pain feels better       [place] CATALINA Canseco  05/27/22 2909

## 2022-05-27 NOTE — H&P (VIEW-ONLY)
[facility]  H&P- Carolyn Pfeiffer 1933, 80 y o  female MRN: [id number]  Unit/Bed#: FT 01 Encounter: [id number]  Primary Care Provider: Med Rider DO   Date and time admitted to hospital: 5/27/2022 11:05 AM    * Temporal arteritis [facility])  Assessment & Plan  Patient presents with unilateral temporal headache with jaw pain this morning now resolved, no visual disturbance found with elevated , CRP 18 6, CT head no acute intracranial abnormality likely due to new onset temporal arteritis  -patient received methylprednisolone 1 g in ED  -will continue methylprednisolone daily  - will need temporal artery biopsy  -monitor ESR and CRP for response  -neurology consult  -analgesic and antiemetic p r n   -Accu-Cheks t i d  while on steroid with ISS coverage    Overactive bladder  Assessment & Plan  -Mirabegron not available, will give oxybutynin    Primary hypertension  Assessment & Plan  -will continue labetalol  -quinapril not available on formulary, will give lisinopril 40 mg daily    Osteoarthritis  Assessment & Plan  -continue tramadol and acetaminophen p r n  Hypothyroidism  Assessment & Plan  -continue levothyroxine      Hyperlipidemia  Assessment & Plan  -continue statin    [facility]iplegia ([facility] )  Assessment & Plan  -H/o CVA with left hemiparesis/hemiplegia in 2019, wheelchair-bound  -continue Plavix, statin    VTE Pharmacologic Prophylaxis: VTE Score: 6 High Risk (Score >/= 5) - Pharmacological DVT Prophylaxis Ordered: heparin  Sequential Compression Devices Ordered  Code Status: Level 1 - Full Code   Discussion with family: Patient declined call to   Anticipated Length of Stay: Patient will be admitted on an inpatient basis with an anticipated length of stay of greater than 2 midnights secondary to Temporal arteritis      Total Time for Visit, including Counseling / Coordination of Care: 60 minutes Greater than 50% of this total time spent on direct patient counseling and coordination of care  Chief Complaint:  Headache    History of Present Illness:  Vee Serrato is a 80 y o  female with a PMH of HTN, HLD, CVA with left residual hemiparesis in 1994 who presents [facility] for evaluation of left temporal headache for the past few days  She reports constant sharp to pressure left temporal pain associated with left jaw pain this morning now resolved  She had nausea 1 week ago and constipation  She denies blurry vision, new weakness, fever, chills, vomiting, cough, chest pain, palpitation, shortness of breath or history of headaches  Review of Systems:  Review of Systems  Comprehensive review of systems negative except in above HPI  Past Medical and Surgical History:   Past Medical History:   Diagnosis Date    Hemiplegia ([facility] )     Hyperlipidemia     Hypothyroidism     Kidney disorder     Osteoarthritis     Schizophrenia ([facility] )     Vascular disease        Past Surgical History:   Procedure Laterality Date    CATARACT EXTRACTION      FRACTURE SURGERY         Meds/Allergies:  Prior to Admission medications    Medication Sig Start Date End Date Taking?  Authorizing Provider   alendronate (FOSAMAX) 70 mg tablet alendronate 70 mg tablet    Historical Provider, MD   amLODIPine (NORVASC) 10 mg tablet 10 mg 7/23/20   Historical Provider, MD   Calcium Carbonate-Vitamin D 600-400 MG-UNIT per tablet 600 tablets 7/23/20   Historical Provider, MD   clopidogrel (PLAVIX) 75 mg tablet  1/5/22   Historical Provider, MD   Diclofenac Sodium (Voltaren) 1 % Voltaren 1 % topical gel    Historical Provider, MD   FeroSul 325 (65 Fe) MG tablet  12/1/21   Historical Provider, MD   folic acid (FOLVITE) 1 mg tablet 1 mg 7/23/20   Historical Provider, MD   gabapentin (NEURONTIN) 100 mg capsule  11/4/21   Historical Provider, MD   gabapentin (NEURONTIN) 300 mg capsule  1/5/22   Historical Provider, MD   labetalol (NORMODYNE) 100 mg tablet 0 5 mg 7/11/20   Historical Provider, MD   levothyroxine 75 mcg tablet 75 mcg 7/11/20   Historical Provider, MD   lovastatin (MEVACOR) 20 mg tablet 20 mg 7/11/20   Historical Provider, MD   meloxicam (MOBIC) 15 mg tablet Take 1 tablet (15 mg total) by mouth daily 9/23/20   Letty Raymond DO   meloxicam (MOBIC) 7 5 mg tablet 7 5 mg 7/23/20   Historical Provider, MD   Myrbetriq 25 MG TB24 25 mg 7/27/20   Historical Provider, MD   OLANZapine (ZyPREXA) 10 mg tablet olanzapine 10 mg tablet    Historical Provider, MD   pravastatin (PRAVACHOL) 20 mg tablet pravastatin 20 mg tablet    Historical Provider, MD   predniSONE 1 mg tablet  11/1/21   Historical Provider, MD   predniSONE 5 mg tablet  11/1/21   Historical Provider, MD   quinapril (ACCUPRIL) 40 MG tablet 40 mg 7/23/20   Historical Provider, MD   risedronate (ACTONEL) 35 mg tablet 35 mg 7/17/20   Historical Provider, MD   sertraline (ZOLOFT) 25 mg tablet Take 25 mg by mouth daily    Historical Provider, MD   traMADol (ULTRAM) 50 mg tablet 50 mg 7/20/20   Historical Provider, MD     I have reveiwed home medications using records provided by [facility]  Allergies: No Known Allergies    Social History:  Marital Status:     Occupation:   Patient Pre-hospital Living Situation: [facility]  Patient Pre-hospital Level of Mobility: manual wheelchair  Patient Pre-hospital Diet Restrictions: none  Substance Use History:   Social History     Substance and Sexual Activity   Alcohol Use None     Social History     Tobacco Use   Smoking Status Never Smoker   Smokeless Tobacco Never Used     Social History     Substance and Sexual Activity   Drug Use Not on file       Family History:  Family History   Problem Relation Age of Onset    No Known Problems Mother     No Known Problems Father        Physical Exam:     Vitals:   Blood Pressure: 133/58 (05/27/22 1359)  Pulse: 85 (05/27/22 1359)  Temperature: 98 5 °F (36 9 °C) (05/27/22 1146)  Temp Source: Oral (05/27/22 1146)  Respirations: 17 (05/27/22 1359)  SpO2: 94 % (05/27/22 1359)    Physical Exam  Vitals and nursing note reviewed  Constitutional:       General: She is not in acute distress  Appearance: Normal appearance  She is well-developed  She is ill-appearing  HENT:      Head: Normocephalic and atraumatic  Mouth/Throat:      Mouth: Mucous membranes are moist    Eyes:      Conjunctiva/sclera: Conjunctivae normal    Cardiovascular:      Rate and Rhythm: Normal rate and regular rhythm  Pulses: Normal pulses  Heart sounds: Normal heart sounds  No murmur heard  Pulmonary:      Effort: Pulmonary effort is normal  No respiratory distress  Breath sounds: Normal breath sounds  Abdominal:      General: Abdomen is flat  Bowel sounds are normal  There is no distension  Palpations: Abdomen is soft  Tenderness: There is no abdominal tenderness  Musculoskeletal:      Cervical back: Neck supple  Right lower leg: No edema  Left lower leg: No edema  Skin:     General: Skin is warm and dry  Neurological:      Mental Status: She is alert and oriented to person, place, and time  Motor: Weakness present  Gait: Gait abnormal (Not assessed)        Comments: Mild left temporal head tenderness  Mild expressive aphasia  Left-sided hemiparesis  RUE/RLE 4/5 strength   Psychiatric:         Mood and Affect: Mood normal          Behavior: Behavior normal           Additional Data:     Lab Results:  Results from last 7 days   Lab Units 05/27/22  1205   WBC Thousand/uL 7 50   HEMOGLOBIN g/dL 12 4   HEMATOCRIT % 39 6   PLATELETS Thousands/uL 198   NEUTROS PCT % 61   LYMPHS PCT % 26   MONOS PCT % 9   EOS PCT % 3     Results from last 7 days   Lab Units 05/27/22  1205   SODIUM mmol/L 139   POTASSIUM mmol/L 3 7   CHLORIDE mmol/L 99*   CO2 mmol/L 31   BUN mg/dL 18   CREATININE mg/dL 0 88   ANION GAP mmol/L 9   CALCIUM mg/dL 9 2   GLUCOSE RANDOM mg/dL 106     Results from last 7 days   Lab Units 05/27/22  1205   INR 1 03                   Imaging: Reviewed radiology reports from this admission including: CT head  CT head without contrast   Final Result by Fletcher Junior DO (05/27 1217)      No acute intracranial abnormality  Stable microangiopathic changes within the brain when compared with prior MRI from 12/22/2021  Workstation performed: [id number]             EKG and Other Studies Reviewed on Admission:   · EKG: No EKG obtained  ** Please Note: This note has been constructed using a voice recognition system   **

## 2022-05-27 NOTE — ED PROVIDER NOTES
History  Chief Complaint   Patient presents with    Headache     Pt reports left sided headache  History of stroke, per SNF staff pt is at her baseline      80 y o   female with hypertension, hyperlipidemia, hypothyroidism, osteoporosis, depression, schizophrenia, h/o CVA with hemiplegia, OA, L inferior and superior pubic ramus fracture 2021 presents to ED via EMS with chief complaint of left sided temporal headache  Onset reports at this morning  Location is reported as left side of head  Quality is reported a sharp severe pain that shoots into jaw  Associated symptoms: denies fevers, denies visual changes from baseline  Denies syncope  Modifiers: given tramadol today without improvement in symptoms  Context: denies recent fall, injury or trauma  Resides at [facility] and has experiences prior CVA with residual deficits  Patient reports no new neurological deficits from baseline  NH staff also reports that patient is at neurological baseline  History provided by:  Patient, EMS personnel and nursing home   used: No    Headache  Associated symptoms: nausea and weakness (baseline from stroke)    Associated symptoms: no eye pain and no fever        Prior to Admission Medications   Prescriptions Last Dose Informant Patient Reported? Taking?    Calcium Carbonate-Vitamin D 600-400 MG-UNIT per tablet  Care Giver Yes No   Si tablets   Diclofenac Sodium (Voltaren) 1 %   Yes No   Sig: Voltaren 1 % topical gel   FeroSul 325 (65 Fe) MG tablet   Yes No   Myrbetriq 25 MG TB24  Care Giver Yes No   Si mg   OLANZapine (ZyPREXA) 10 mg tablet   Yes No   Sig: olanzapine 10 mg tablet   alendronate (FOSAMAX) 70 mg tablet   Yes No   Sig: alendronate 70 mg tablet   amLODIPine (NORVASC) 10 mg tablet  Care Giver Yes No   Sig: 10 mg   clopidogrel (PLAVIX) 75 mg tablet   Yes No   folic acid (FOLVITE) 1 mg tablet  Care Giver Yes No   Si mg   gabapentin (NEURONTIN) 100 mg capsule Yes No   gabapentin (NEURONTIN) 300 mg capsule   Yes No   labetalol (NORMODYNE) 100 mg tablet  Care Giver Yes No   Si 5 mg   levothyroxine 75 mcg tablet  Care Giver Yes No   Si mcg   lovastatin (MEVACOR) 20 mg tablet  Care Giver Yes No   Si mg   meloxicam (MOBIC) 15 mg tablet  Care Giver No No   Sig: Take 1 tablet (15 mg total) by mouth daily   meloxicam (MOBIC) 7 5 mg tablet  Care Giver Yes No   Si 5 mg   pravastatin (PRAVACHOL) 20 mg tablet   Yes No   Sig: pravastatin 20 mg tablet   predniSONE 1 mg tablet   Yes No   predniSONE 5 mg tablet   Yes No   quinapril (ACCUPRIL) 40 MG tablet  Care Giver Yes No   Si mg   risedronate (ACTONEL) 35 mg tablet  Care Giver Yes No   Si mg   sertraline (ZOLOFT) 25 mg tablet  Care Giver Yes No   Sig: Take 25 mg by mouth daily   traMADol (ULTRAM) 50 mg tablet  Care Giver Yes No   Si mg      Facility-Administered Medications: None       Past Medical History:   Diagnosis Date    [facility]legia ([facility] )     Hyperlipidemia     Hypothyroidism     Kidney disorder     Osteoarthritis     Schizophrenia ([facility] )     Vascular disease        Past Surgical History:   Procedure Laterality Date    CATARACT EXTRACTION      FRACTURE SURGERY         Family History   Problem Relation Age of Onset    No Known Problems Mother     No Known Problems Father      I have reviewed and agree with the history as documented  E-Cigarette/Vaping    E-Cigarette Use Never User      E-Cigarette/Vaping Substances    Nicotine No     THC No     CBD No     Flavoring No     Other No     Unknown No      Social History     Tobacco Use    Smoking status: Never Smoker    Smokeless tobacco: Never Used   Vaping Use    Vaping Use: Never used   Substance Use Topics    Alcohol use: Never       Review of Systems   Constitutional: Negative for fever  Eyes: Negative for pain  Gastrointestinal: Positive for nausea  Skin: Negative for rash     Neurological: Positive for weakness (baseline from stroke) and headaches  All other systems reviewed and are negative  Physical Exam  Physical Exam  Vitals and nursing note reviewed  Constitutional:       General: She is not in acute distress  Appearance: Normal appearance  She is well-developed  She is not ill-appearing  Comments: /79   Pulse 95   Temp 98 °F (36 7 °C)   Resp 20   Ht 5' 6" (1 676 m)   Wt 98 2 kg (216 lb 7 9 oz)   SpO2 95%   BMI 34 94 kg/m²      HENT:      Head: Normocephalic and atraumatic  Right Ear: External ear normal       Left Ear: External ear normal       Nose: Nose normal       Mouth/Throat:      Mouth: Mucous membranes are moist    Eyes:      General: No scleral icterus  Right eye: No discharge  Left eye: No discharge  Conjunctiva/sclera: Conjunctivae normal    Cardiovascular:      Rate and Rhythm: Normal rate  Pulses: Normal pulses  Pulmonary:      Effort: Pulmonary effort is normal  No respiratory distress  Musculoskeletal:         General: No swelling, tenderness, deformity or signs of injury  Cervical back: Normal range of motion and neck supple  No rigidity or tenderness  No muscular tenderness  Skin:     Capillary Refill: Capillary refill takes less than 2 seconds  Coloration: Skin is not jaundiced or pale  Findings: No erythema or rash  Neurological:      Mental Status: She is alert and oriented to person, place, and time  Mental status is at baseline  Psychiatric:         Mood and Affect: Mood normal          Behavior: Behavior normal          Thought Content:  Thought content normal          Judgment: Judgment normal          Vital Signs  ED Triage Vitals   Temperature Pulse Respirations Blood Pressure SpO2   05/27/22 1146 05/27/22 1112 05/27/22 1112 05/27/22 1112 05/27/22 1112   98 5 °F (36 9 °C) 84 18 169/68 94 %      Temp Source Heart Rate Source Patient Position - Orthostatic VS BP Location FiO2 (%)   05/27/22 1146 05/27/22 1112 05/27/22 1112 05/27/22 1112 --   Oral Monitor Lying Left arm       Pain Score       05/27/22 1112       4           Vitals:    05/27/22 2127 05/28/22 0701 05/28/22 1101 05/28/22 1411   BP: (!) 172/99 164/87 157/79 160/79   Pulse: (!) 111 105 101 95   Patient Position - Orthostatic VS:             Visual Acuity      ED Medications  Medications   ondansetron (ZOFRAN) injection 4 mg (4 mg Intravenous Given 5/27/22 1204)   fentanyl citrate (PF) 100 MCG/2ML 50 mcg (50 mcg Intravenous Given 5/27/22 1204)   methylPREDNISolone sodium succinate (Solu-MEDROL) 1,000 mg in sodium chloride 0 9 % 250 mL IVPB (0 mg Intravenous Stopped 5/27/22 1455)   potassium chloride (K-DUR,KLOR-CON) CR tablet 20 mEq (20 mEq Oral Given 5/28/22 0935)       Diagnostic Studies  Results Reviewed     Procedure Component Value Units Date/Time    C-reactive protein [[id number]]  (Abnormal) Collected: 05/28/22 0525    Lab Status: Final result Specimen: Blood from Arm, Left Updated: 05/28/22 0634     CRP 12 4 mg/L     Basic metabolic panel [[id number]]  (Abnormal) Collected: 05/28/22 0525    Lab Status: Final result Specimen: Blood from Arm, Left Updated: 05/28/22 7225     Sodium 137 mmol/L      Potassium 3 4 mmol/L      Chloride 98 mmol/L      CO2 26 mmol/L      ANION GAP 13 mmol/L      BUN 20 mg/dL      Creatinine 0 81 mg/dL      Glucose 134 mg/dL      Calcium 9 1 mg/dL      eGFR 64 ml/min/1 73sq m     Narrative:      [place] guidelines for Chronic Kidney Disease (CKD):     Stage 1 with normal or high GFR (GFR > 90 mL/min/1 73 square meters)    Stage 2 Mild CKD (GFR = 60-89 mL/min/1 73 square meters)    Stage 3A Moderate CKD (GFR = 45-59 mL/min/1 73 square meters)    Stage 3B Moderate CKD (GFR = 30-44 mL/min/1 73 square meters)    Stage 4 Severe CKD (GFR = 15-29 mL/min/1 73 square meters)    Stage 5 End Stage CKD (GFR <15 mL/min/1 73 square meters)  Note: GFR calculation is accurate only with a steady state creatinine Platelet count [[id number]]  (Normal) Collected: 05/27/22 2241    Lab Status: Final result Specimen: Blood from Arm, Left Updated: 05/27/22 2250     Platelets 606 Thousands/uL      MPV 9 9 fL     Fingerstick Glucose (POCT) [[id number]]  (Normal) Collected: 05/27/22 1740    Lab Status: Final result Updated: 05/27/22 1747     POC Glucose 129 mg/dl     C-reactive protein [[id number]]  (Abnormal) Collected: 05/27/22 1205    Lab Status: Final result Specimen: Blood from Arm, Left Updated: 05/27/22 1313     CRP 18 6 mg/L     COVID/FLU/RSV - 2 hour TAT [[id number]]  (Normal) Collected: 05/27/22 1205    Lab Status: Final result Specimen: Nares from Nose Updated: 05/27/22 1251     SARS-CoV-2 Negative     INFLUENZA A PCR Negative     INFLUENZA B PCR Negative     RSV PCR Negative    Narrative:      FOR PEDIATRIC PATIENTS - copy/paste COVID Guidelines URL to browser: [email or web address]  [email or web address]    SARS-CoV-2 assay is a Nucleic Acid Amplification assay intended for the  qualitative detection of nucleic acid from SARS-CoV-2 in nasopharyngeal  swabs  Results are for the presumptive identification of SARS-CoV-2 RNA  Positive results are indicative of infection with SARS-CoV-2, the virus  causing COVID-19, but do not rule out bacterial infection or co-infection  with other viruses  Laboratories within the [place] and its  territories are required to report all positive results to the appropriate  public health authorities  Negative results do not preclude SARS-CoV-2  infection and should not be used as the sole basis for treatment or other  patient management decisions  Negative results must be combined with  clinical observations, patient history, and epidemiological information  This test has not been FDA cleared or approved  This test has been authorized by FDA under an Emergency Use Authorization  (EUA)   This test is only authorized for the duration of time the  declaration that circumstances exist justifying the authorization of the  emergency use of an in vitro diagnostic tests for detection of SARS-CoV-2  virus and/or diagnosis of COVID-19 infection under section 564(b)(1) of  the Act, 21 U  S C  893TVX-4(B)(7), unless the authorization is terminated  or revoked sooner  The test has been validated but independent review by FDA  and CLIA is pending  Test performed using [employer] GeneXpert: This RT-PCR assay targets N2,  a region unique to SARS-CoV-2  A conserved region in the E-gene was chosen  for pan-Sarbecovirus detection which includes SARS-CoV-2      Protime-INR [[id number]]  (Normal) Collected: 05/27/22 1205    Lab Status: Final result Specimen: Blood from Arm, Left Updated: 05/27/22 1234     Protime 13 1 seconds      INR 1 03    APTT [[id number]]  (Normal) Collected: 05/27/22 1205    Lab Status: Final result Specimen: Blood from Arm, Left Updated: 05/27/22 1234     PTT 33 seconds     Basic metabolic panel [[id number]]  (Abnormal) Collected: 05/27/22 1205    Lab Status: Final result Specimen: Blood from Arm, Left Updated: 05/27/22 1233     Sodium 139 mmol/L      Potassium 3 7 mmol/L      Chloride 99 mmol/L      CO2 31 mmol/L      ANION GAP 9 mmol/L      BUN 18 mg/dL      Creatinine 0 88 mg/dL      Glucose 106 mg/dL      Calcium 9 2 mg/dL      eGFR 58 ml/min/1 73sq m     Narrative:      [place] guidelines for Chronic Kidney Disease (CKD):     Stage 1 with normal or high GFR (GFR > 90 mL/min/1 73 square meters)    Stage 2 Mild CKD (GFR = 60-89 mL/min/1 73 square meters)    Stage 3A Moderate CKD (GFR = 45-59 mL/min/1 73 square meters)    Stage 3B Moderate CKD (GFR = 30-44 mL/min/1 73 square meters)    Stage 4 Severe CKD (GFR = 15-29 mL/min/1 73 square meters)    Stage 5 End Stage CKD (GFR <15 mL/min/1 73 square meters)  Note: GFR calculation is accurate only with a steady state creatinine    Sedimentation rate, automated [[id number]]  (Abnormal) Collected: 05/27/22 1205    Lab Status: Final result Specimen: Blood from Arm, Left Updated: 05/27/22 1230     Sed Rate 117 mm/hour     CBC and differential [[id number]]  (Abnormal) Collected: 05/27/22 1205    Lab Status: Final result Specimen: Blood from Arm, Left Updated: 05/27/22 1213     WBC 7 50 Thousand/uL      RBC 4 48 Million/uL      Hemoglobin 12 4 g/dL      Hematocrit 39 6 %      MCV 88 fL      MCH 27 7 pg      MCHC 31 3 g/dL      RDW 15 3 %      MPV 10 3 fL      Platelets 508 Thousands/uL      nRBC 0 /100 WBCs      Neutrophils Relative 61 %      Immat GRANS % 0 %      Lymphocytes Relative 26 %      Monocytes Relative 9 %      Eosinophils Relative 3 %      Basophils Relative 1 %      Neutrophils Absolute 4 66 Thousands/µL      Immature Grans Absolute 0 03 Thousand/uL      Lymphocytes Absolute 1 91 Thousands/µL      Monocytes Absolute 0 66 Thousand/µL      Eosinophils Absolute 0 20 Thousand/µL      Basophils Absolute 0 04 Thousands/µL                  CT head without contrast   Final Result by Fletcher Lucero DO (05/27 1217)      No acute intracranial abnormality  Stable microangiopathic changes within the brain when compared with prior MRI from 12/22/2021  Workstation performed: [id number]                    Procedures  Procedures         ED Course  ED Course as of 05/30/22 1020   Fri May 27, 2022   1230 Lab results reviewed  CBC demonstrates white blood cell count 7 5, hemoglobin of 12 hematocrit 39 are normal   No anemia  Sedimentation rate is significantly elevated 117, concerning for temporal arteritis  1244 Will start 1 gram solumedrol for temporal arteritis  Results discussed with patient  Will admit  Identification of Seniors at [facility] Most Recent Value   (ISAR) Identification of Seniors at Risk    Before the illness or injury that brought you to the Emergency, did you need someone to help you on a regular basis?  1 Filed at: 05/27/2022 1112   In the last 24 hours, have you needed more help than usual? 0 Filed at: 05/27/2022 1112   Have you been hospitalized for one or more nights during the past 6 months? 1 Filed at: 05/27/2022 1112   In general, do you see well? 0 Filed at: 05/27/2022 1112   In general, do you have serious problems with your memory? 0 Filed at: 05/27/2022 1112   Do you take more than three different medications every day? 1 Filed at: 05/27/2022 1112   ISAR Score 3 Filed at: 05/27/2022 1112                                    MDM  Number of Diagnoses or Management Options  Temporal arteritis [facility]): new and requires workup  Diagnosis management comments: ddx includes but is not limited to tension headache, migraine headache, cluster headache, sinusitis, SAH, SDH, doubt temporal arteritis due to lack of unilateral temporal location of pain, doubt intracranial hemorrhage, doubt meningitis due to lack of fever/nuchal rigidity  ED coarse:  80year old female with history of stroke with residual deficits presents with sudden onset left temporal headache this morning  Atraumatic  Ct head results reviewed:  No acute intracranial abnormality  Stable microangiopathic changes within the brain when compared with prior MRI from 12/22/2021  Labs remarkable for , CRP 18 6 concerning for temporal arteritis  Covid/flu testing negative  No anemia or thrombocytopenia on cbc  Patient started on 1000 mg solumedrol IV and admitted for further evaluation of temporal arteritis  Case discussed with Dr Maria Esther Garcia, [facility] regarding admission           Amount and/or Complexity of Data Reviewed  Clinical lab tests: ordered and reviewed  Tests in the radiology section of CPT®: ordered and reviewed  Discussion of test results with the performing providers: yes  Obtain history from someone other than the patient: yes (EMS, Nursing Home)  Discuss the patient with other providers: yes (Dr Maria Esther Garcia, [facility])  Independent visualization of images, tracings, or specimens: yes        Disposition  Final diagnoses:   Temporal arteritis ([facility] )     Time reflects when diagnosis was documented in both MDM as applicable and the Disposition within this note     Time User Action Codes Description Comment    5/27/2022  1:09 PM Lanetta Senate Add [M31 6] Temporal arteritis ([facility] )     5/28/2022  1:49 PM Markie Pillar Add [F20 9] Schizophrenia ([facility] )     5/28/2022  1:49 PM Demo, [place] Add [G81 90] Hemiplegia ([facility] )     5/28/2022  3:58 PM Demo, [place] Add [I10] Primary hypertension     5/28/2022  3:58 PM Demo, [place] Add [E03 9] Acquired hypothyroidism     5/28/2022  3:58 PM Markie Pillar Add [M19 90] Osteoarthritis     5/28/2022  3:58 PM Demo, [place] Add [N32 81] Overactive bladder     5/28/2022  3:58 PM Markie Pillar Add [K59 00] Constipated       ED Disposition     ED Disposition   Admit    Condition   Stable    Date/Time   Fri May 27, 2022  1:09 PM    Comment   Case was discussed with Dr Robert Wesley and the patient's admission status was agreed to be Admission Status: inpatient status to the service of Dr Robert Wesley MD Documentation    [address], [address] by (Company and Unit #) [facility]      RN Documentation    [address], [address]   Transport Mode Ambulance   Transported by [employer] and Unit #) [facility]   Level of Care Basic life support   Transfer Date 05/28/22   Transfer Time 2100      Follow-up Information     Follow up With Specialties Details Why Contact Info    Camille Montana DO Vascular Surgery, Radiology Follow up  [address] [address] [address] [facility]  [phone]            Discharge Medication List as of 5/28/2022  6:02 PM      START taking these medications    Details   acetaminophen (TYLENOL) 325 mg tablet Take 2 tablets (650 mg total) by mouth every 6 (six) hours as needed for mild pain or moderate pain, Starting Sat 5/28/2022, No Print      DULoxetine (CYMBALTA) 20 mg capsule Take 1 capsule (20 mg total) by mouth daily, Starting Sun 5/29/2022, Until Tue 6/28/2022, No Print      pantoprazole (PROTONIX) 20 mg tablet Take 1 tablet (20 mg total) by mouth daily in the early morning, Starting Sun 5/29/2022, No Print      senna (SENOKOT) 8 6 mg Take 1 tablet (8 6 mg total) by mouth daily at bedtime as needed for constipation, Starting Sat 5/28/2022, No Print         CONTINUE these medications which have CHANGED    Details   gabapentin (NEURONTIN) 300 mg capsule Take 1 capsule (300 mg total) by mouth 3 (three) times a day, Starting Sat 5/28/2022, No Print      labetalol (NORMODYNE) 100 mg tablet Take 0 5 tablets (50 mg total) by mouth in the morning, Starting Sat 5/28/2022, No Print      levothyroxine 100 mcg tablet Take 1 tablet (100 mcg total) by mouth daily in the early morning, Starting Sun 5/29/2022, No Print      predniSONE 20 mg tablet Take 3 tablets (60 mg total) by mouth daily, Starting Sun 5/29/2022, No Print      traMADol (ULTRAM) 50 mg tablet Take 1 tablet (50 mg total) by mouth every 8 (eight) hours as needed for moderate pain, Starting Sat 5/28/2022, Print         CONTINUE these medications which have NOT CHANGED    Details   alendronate (FOSAMAX) 70 mg tablet alendronate 70 mg tablet, Historical Med      amLODIPine (NORVASC) 10 mg tablet 10 mg, Starting Thu 7/23/2020, Historical Med      Calcium Carbonate-Vitamin D 600-400 MG-UNIT per tablet 600 tablets, Starting Thu 7/23/2020, Historical Med      clopidogrel (PLAVIX) 75 mg tablet Starting Wed 1/5/2022, Historical Med      Diclofenac Sodium (Voltaren) 1 % Voltaren 1 % topical gel, Historical Med      FeroSul 325 (65 Fe) MG tablet Starting Wed 12/1/2021, Historical Med      folic acid (FOLVITE) 1 mg tablet 1 mg, Starting Thu 7/23/2020, Historical Med      !! meloxicam (MOBIC) 15 mg tablet Take 1 tablet (15 mg total) by mouth daily, Starting Wed 9/23/2020, Print      !! meloxicam (MOBIC) 7 5 mg tablet 7 5 mg, Starting Thu 7/23/2020, Historical Med      Myrbetriq 25 MG TB24 25 mg, Starting Mon 7/27/2020, Historical Med      pravastatin (PRAVACHOL) 20 mg tablet pravastatin 20 mg tablet, Historical Med      quinapril (ACCUPRIL) 40 MG tablet 40 mg, Starting Thu 7/23/2020, Historical Med      risedronate (ACTONEL) 35 mg tablet 35 mg, Starting Fri 7/17/2020, Historical Med       !! - Potential duplicate medications found  Please discuss with provider  STOP taking these medications       lovastatin (MEVACOR) 20 mg tablet Comments:   Reason for Stopping:         OLANZapine (ZyPREXA) 10 mg tablet Comments:   Reason for Stopping:         sertraline (ZOLOFT) 25 mg tablet Comments:   Reason for Stopping:               Outpatient Discharge Orders   Ambulatory Referral to Vascular Surgery   Standing Status: Future Standing Exp   Date: 05/28/23      Discharge Condtion:  Stabilized     Patient Aware of Diagnosis: No     Free of Communicable Disease:   Yes       PDMP Review     None          ED Provider  Electronically Signed by           Azucena Vega PA-C  05/30/22 [id number]

## 2022-05-27 NOTE — H&P
[facility]  H&P- Yonas Adames 1933, 80 y o  female MRN: [id number]  Unit/Bed#: FT 01 Encounter: [id number]  Primary Care Provider: Taj Martinez DO   Date and time admitted to hospital: 5/27/2022 11:05 AM    * Temporal arteritis [facility])  Assessment & Plan  Patient presents with unilateral temporal headache with jaw pain this morning now resolved, no visual disturbance found with elevated , CRP 18 6, CT head no acute intracranial abnormality likely due to new onset temporal arteritis  -patient received methylprednisolone 1 g in ED  -will continue methylprednisolone daily  - will need temporal artery biopsy  -monitor ESR and CRP for response  -neurology consult  -analgesic and antiemetic p r n   -Accu-Cheks t i d  while on steroid with ISS coverage    Overactive bladder  Assessment & Plan  -Mirabegron not available, will give oxybutynin    Primary hypertension  Assessment & Plan  -will continue labetalol  -quinapril not available on formulary, will give lisinopril 40 mg daily    Osteoarthritis  Assessment & Plan  -continue tramadol and acetaminophen p r n  Hypothyroidism  Assessment & Plan  -continue levothyroxine      Hyperlipidemia  Assessment & Plan  -continue statin    Hemiplegia ([facility] [address] )  Assessment & Plan  -H/o CVA with left hemiparesis/hemiplegia in 2019, wheelchair-bound  -continue Plavix, statin    VTE Pharmacologic Prophylaxis: VTE Score: 6 High Risk (Score >/= 5) - Pharmacological DVT Prophylaxis Ordered: heparin  Sequential Compression Devices Ordered  Code Status: Level 1 - Full Code   Discussion with family: Patient declined call to   Anticipated Length of Stay: Patient will be admitted on an inpatient basis with an anticipated length of stay of greater than 2 midnights secondary to Temporal arteritis      Total Time for Visit, including Counseling / Coordination of Care: 60 minutes Greater than 50% of this total time spent on direct patient counseling and coordination of care  Chief Complaint:  Headache    History of Present Illness:  Benedicto Sanderson is a 80 y o  female with a PMH of HTN, HLD, CVA with left residual hemiparesis in 1994 who presents [facility] for evaluation of left temporal headache for the past few days  She reports constant sharp to pressure left temporal pain associated with left jaw pain this morning now resolved  She had nausea 1 week ago and constipation  She denies blurry vision, new weakness, fever, chills, vomiting, cough, chest pain, palpitation, shortness of breath or history of headaches  Review of Systems:  Review of Systems  Comprehensive review of systems negative except in above HPI  Past Medical and Surgical History:   Past Medical History:   Diagnosis Date    Hemiplegia ([facility] )     Hyperlipidemia     Hypothyroidism     Kidney disorder     Osteoarthritis     Schizophrenia ([facility] )     Vascular disease        Past Surgical History:   Procedure Laterality Date    CATARACT EXTRACTION      FRACTURE SURGERY         Meds/Allergies:  Prior to Admission medications    Medication Sig Start Date End Date Taking?  Authorizing Provider   alendronate (FOSAMAX) 70 mg tablet alendronate 70 mg tablet    Historical Provider, MD   amLODIPine (NORVASC) 10 mg tablet 10 mg 7/23/20   Historical Provider, MD   Calcium Carbonate-Vitamin D 600-400 MG-UNIT per tablet 600 tablets 7/23/20   Historical Provider, MD   clopidogrel (PLAVIX) 75 mg tablet  1/5/22   Historical Provider, MD   Diclofenac Sodium (Voltaren) 1 % Voltaren 1 % topical gel    Historical Provider, MD   FeroSul 325 (65 Fe) MG tablet  12/1/21   Historical Provider, MD   folic acid (FOLVITE) 1 mg tablet 1 mg 7/23/20   Historical Provider, MD   gabapentin (NEURONTIN) 100 mg capsule  11/4/21   Historical Provider, MD   gabapentin (NEURONTIN) 300 mg capsule  1/5/22   Historical Provider, MD   labetalol (NORMODYNE) 100 mg tablet 0 5 mg 7/11/20   Historical Provider, MD   levothyroxine 75 mcg tablet 75 mcg 7/11/20   Historical Provider, MD   lovastatin (MEVACOR) 20 mg tablet 20 mg 7/11/20   Historical Provider, MD   meloxicam (MOBIC) 15 mg tablet Take 1 tablet (15 mg total) by mouth daily 9/23/20   Letty Raymond DO   meloxicam (MOBIC) 7 5 mg tablet 7 5 mg 7/23/20   Historical Provider, MD   Myrbetriq 25 MG TB24 25 mg 7/27/20   Historical Provider, MD   OLANZapine (ZyPREXA) 10 mg tablet olanzapine 10 mg tablet    Historical Provider, MD   pravastatin (PRAVACHOL) 20 mg tablet pravastatin 20 mg tablet    Historical Provider, MD   predniSONE 1 mg tablet  11/1/21   Historical Provider, MD   predniSONE 5 mg tablet  11/1/21   Historical Provider, MD   quinapril (ACCUPRIL) 40 MG tablet 40 mg 7/23/20   Historical Provider, MD   risedronate (ACTONEL) 35 mg tablet 35 mg 7/17/20   Historical Provider, MD   sertraline (ZOLOFT) 25 mg tablet Take 25 mg by mouth daily    Historical Provider, MD   traMADol (ULTRAM) 50 mg tablet 50 mg 7/20/20   Historical Provider, MD     I have reveiwed home medications using records provided by [facility]  Allergies: No Known Allergies    Social History:  Marital Status:     Occupation:   Patient Pre-hospital Living Situation: [facility]  Patient Pre-hospital Level of Mobility: manual wheelchair  Patient Pre-hospital Diet Restrictions: none  Substance Use History:   Social History     Substance and Sexual Activity   Alcohol Use None     Social History     Tobacco Use   Smoking Status Never Smoker   Smokeless Tobacco Never Used     Social History     Substance and Sexual Activity   Drug Use Not on file       Family History:  Family History   Problem Relation Age of Onset    No Known Problems Mother     No Known Problems Father        Physical Exam:     Vitals:   Blood Pressure: 133/58 (05/27/22 1359)  Pulse: 85 (05/27/22 1359)  Temperature: 98 5 °F (36 9 °C) (05/27/22 1146)  Temp Source: Oral (05/27/22 1146)  Respirations: 17 (05/27/22 1359)  SpO2: 94 % (05/27/22 1359)    Physical Exam  Vitals and nursing note reviewed  Constitutional:       General: She is not in acute distress  Appearance: Normal appearance  She is well-developed  She is ill-appearing  HENT:      Head: Normocephalic and atraumatic  Mouth/Throat:      Mouth: Mucous membranes are moist    Eyes:      Conjunctiva/sclera: Conjunctivae normal    Cardiovascular:      Rate and Rhythm: Normal rate and regular rhythm  Pulses: Normal pulses  Heart sounds: Normal heart sounds  No murmur heard  Pulmonary:      Effort: Pulmonary effort is normal  No respiratory distress  Breath sounds: Normal breath sounds  Abdominal:      General: Abdomen is flat  Bowel sounds are normal  There is no distension  Palpations: Abdomen is soft  Tenderness: There is no abdominal tenderness  Musculoskeletal:      Cervical back: Neck supple  Right lower leg: No edema  Left lower leg: No edema  Skin:     General: Skin is warm and dry  Neurological:      Mental Status: She is alert and oriented to person, place, and time  Motor: Weakness present  Gait: Gait abnormal (Not assessed)        Comments: Mild left temporal head tenderness  Mild expressive aphasia  Left-sided hemiparesis  RUE/RLE 4/5 strength   Psychiatric:         Mood and Affect: Mood normal          Behavior: Behavior normal           Additional Data:     Lab Results:  Results from last 7 days   Lab Units 05/27/22  1205   WBC Thousand/uL 7 50   HEMOGLOBIN g/dL 12 4   HEMATOCRIT % 39 6   PLATELETS Thousands/uL 198   NEUTROS PCT % 61   LYMPHS PCT % 26   MONOS PCT % 9   EOS PCT % 3     Results from last 7 days   Lab Units 05/27/22  1205   SODIUM mmol/L 139   POTASSIUM mmol/L 3 7   CHLORIDE mmol/L 99*   CO2 mmol/L 31   BUN mg/dL 18   CREATININE mg/dL 0 88   ANION GAP mmol/L 9   CALCIUM mg/dL 9 2   GLUCOSE RANDOM mg/dL 106     Results from last 7 days   Lab Units 05/27/22  1205   INR 1 03                   Imaging: Reviewed radiology reports from this admission including: CT head  CT head without contrast   Final Result by Fletcher England DO (05/27 1217)      No acute intracranial abnormality  Stable microangiopathic changes within the brain when compared with prior MRI from 12/22/2021  Workstation performed: [id number]             EKG and Other Studies Reviewed on Admission:   · EKG: No EKG obtained  ** Please Note: This note has been constructed using a voice recognition system   **

## 2022-05-27 NOTE — ASSESSMENT & PLAN NOTE
Patient presents with unilateral temporal headache with jaw pain this morning now resolved, no visual disturbance found with elevated , CRP 18 6, CT head no acute intracranial abnormality likely due to new onset temporal arteritis  -patient received methylprednisolone 1 g in ED  -will continue methylprednisolone daily  - will need temporal artery biopsy  -monitor ESR and CRP for response  -neurology consult  -analgesic and antiemetic p r n   -Accu-Cheks t i d  while on steroid with ISS coverage

## 2022-05-28 VITALS
TEMPERATURE: 98 F | SYSTOLIC BLOOD PRESSURE: 160 MMHG | DIASTOLIC BLOOD PRESSURE: 79 MMHG | BODY MASS INDEX: 34.79 KG/M2 | OXYGEN SATURATION: 95 % | RESPIRATION RATE: 20 BRPM | WEIGHT: 216.49 LBS | HEIGHT: 66 IN | HEART RATE: 95 BPM

## 2022-05-28 LAB
ANION GAP SERPL CALCULATED.3IONS-SCNC: 13 MMOL/L (ref 4–13)
BUN SERPL-MCNC: 20 MG/DL (ref 5–25)
CALCIUM SERPL-MCNC: 9.1 MG/DL (ref 8.3–10.1)
CHLORIDE SERPL-SCNC: 98 MMOL/L (ref 100–108)
CO2 SERPL-SCNC: 26 MMOL/L (ref 21–32)
CREAT SERPL-MCNC: 0.81 MG/DL (ref 0.6–1.3)
CRP SERPL QL: 12.4 MG/L
GFR SERPL CREATININE-BSD FRML MDRD: 64 ML/MIN/1.73SQ M
GLUCOSE SERPL-MCNC: 134 MG/DL (ref 65–140)
GLUCOSE SERPL-MCNC: 143 MG/DL (ref 65–140)
GLUCOSE SERPL-MCNC: 147 MG/DL (ref 65–140)
GLUCOSE SERPL-MCNC: 157 MG/DL (ref 65–140)
GLUCOSE SERPL-MCNC: 163 MG/DL (ref 65–140)
GLUCOSE SERPL-MCNC: 188 MG/DL (ref 65–140)
POTASSIUM SERPL-SCNC: 3.4 MMOL/L (ref 3.5–5.3)
SODIUM SERPL-SCNC: 137 MMOL/L (ref 136–145)

## 2022-05-28 PROCEDURE — 99221 1ST HOSP IP/OBS SF/LOW 40: CPT | Performed by: PSYCHIATRY & NEUROLOGY

## 2022-05-28 PROCEDURE — 86140 C-REACTIVE PROTEIN: CPT | Performed by: INTERNAL MEDICINE

## 2022-05-28 PROCEDURE — 99239 HOSP IP/OBS DSCHRG MGMT >30: CPT | Performed by: PHYSICIAN ASSISTANT

## 2022-05-28 PROCEDURE — 80048 BASIC METABOLIC PNL TOTAL CA: CPT | Performed by: INTERNAL MEDICINE

## 2022-05-28 PROCEDURE — 82948 REAGENT STRIP/BLOOD GLUCOSE: CPT

## 2022-05-28 RX ORDER — GABAPENTIN 300 MG/1
300 CAPSULE ORAL 3 TIMES DAILY
Qty: 90 CAPSULE | Refills: 0
Start: 2022-05-28

## 2022-05-28 RX ORDER — PREDNISONE 20 MG/1
60 TABLET ORAL DAILY
Qty: 42 TABLET | Refills: 0
Start: 2022-05-29

## 2022-05-28 RX ORDER — TRAMADOL HYDROCHLORIDE 50 MG/1
50 TABLET ORAL EVERY 8 HOURS PRN
Qty: 10 TABLET | Refills: 0 | Status: SHIPPED | OUTPATIENT
Start: 2022-05-28

## 2022-05-28 RX ORDER — DULOXETIN HYDROCHLORIDE 20 MG/1
20 CAPSULE, DELAYED RELEASE ORAL DAILY
Qty: 30 CAPSULE | Refills: 0
Start: 2022-05-29 | End: 2022-06-28

## 2022-05-28 RX ORDER — LABETALOL 100 MG/1
50 TABLET, FILM COATED ORAL DAILY
Qty: 15 TABLET | Refills: 0
Start: 2022-05-28

## 2022-05-28 RX ORDER — PANTOPRAZOLE SODIUM 20 MG/1
20 TABLET, DELAYED RELEASE ORAL
Qty: 30 TABLET | Refills: 0
Start: 2022-05-29

## 2022-05-28 RX ORDER — DULOXETIN HYDROCHLORIDE 20 MG/1
20 CAPSULE, DELAYED RELEASE ORAL DAILY
Status: DISCONTINUED | OUTPATIENT
Start: 2022-05-29 | End: 2022-05-29 | Stop reason: HOSPADM

## 2022-05-28 RX ORDER — POTASSIUM CHLORIDE 20 MEQ/1
20 TABLET, EXTENDED RELEASE ORAL ONCE
Status: COMPLETED | OUTPATIENT
Start: 2022-05-28 | End: 2022-05-28

## 2022-05-28 RX ORDER — SENNOSIDES 8.6 MG
8.6 TABLET ORAL
Qty: 30 TABLET | Refills: 0
Start: 2022-05-28

## 2022-05-28 RX ORDER — GABAPENTIN 300 MG/1
300 CAPSULE ORAL 3 TIMES DAILY
Status: DISCONTINUED | OUTPATIENT
Start: 2022-05-28 | End: 2022-05-29 | Stop reason: HOSPADM

## 2022-05-28 RX ORDER — LEVOTHYROXINE SODIUM 0.1 MG/1
100 TABLET ORAL
Qty: 30 TABLET | Refills: 0
Start: 2022-05-29

## 2022-05-28 RX ORDER — PREDNISONE 20 MG/1
60 TABLET ORAL DAILY
Status: DISCONTINUED | OUTPATIENT
Start: 2022-05-28 | End: 2022-05-29 | Stop reason: HOSPADM

## 2022-05-28 RX ORDER — ACETAMINOPHEN 325 MG/1
650 TABLET ORAL EVERY 6 HOURS PRN
Refills: 0
Start: 2022-05-28

## 2022-05-28 RX ORDER — GABAPENTIN 300 MG/1
900 CAPSULE ORAL 3 TIMES DAILY
Status: DISCONTINUED | OUTPATIENT
Start: 2022-05-28 | End: 2022-05-28

## 2022-05-28 RX ADMIN — OXYBUTYNIN CHLORIDE 5 MG: 5 TABLET, EXTENDED RELEASE ORAL at 09:06

## 2022-05-28 RX ADMIN — LEVOTHYROXINE SODIUM 100 MCG: 100 TABLET ORAL at 05:41

## 2022-05-28 RX ADMIN — FOLIC ACID 1 MG: 1 TABLET ORAL at 09:06

## 2022-05-28 RX ADMIN — TRAMADOL HYDROCHLORIDE 50 MG: 50 TABLET, COATED ORAL at 05:51

## 2022-05-28 RX ADMIN — HEPARIN SODIUM 5000 UNITS: 5000 INJECTION INTRAVENOUS; SUBCUTANEOUS at 09:06

## 2022-05-28 RX ADMIN — FERROUS SULFATE TAB 325 MG (65 MG ELEMENTAL FE) 325 MG: 325 (65 FE) TAB at 09:06

## 2022-05-28 RX ADMIN — LISINOPRIL 40 MG: 20 TABLET ORAL at 09:06

## 2022-05-28 RX ADMIN — GABAPENTIN 300 MG: 300 CAPSULE ORAL at 09:15

## 2022-05-28 RX ADMIN — ACETAMINOPHEN 650 MG: 325 TABLET, FILM COATED ORAL at 13:01

## 2022-05-28 RX ADMIN — INSULIN LISPRO 1 UNITS: 100 INJECTION, SOLUTION INTRAVENOUS; SUBCUTANEOUS at 17:59

## 2022-05-28 RX ADMIN — GABAPENTIN 300 MG: 300 CAPSULE ORAL at 20:33

## 2022-05-28 RX ADMIN — INSULIN LISPRO 1 UNITS: 100 INJECTION, SOLUTION INTRAVENOUS; SUBCUTANEOUS at 13:03

## 2022-05-28 RX ADMIN — Medication 1 TABLET: at 09:06

## 2022-05-28 RX ADMIN — PANTOPRAZOLE SODIUM 20 MG: 20 TABLET, DELAYED RELEASE ORAL at 05:41

## 2022-05-28 RX ADMIN — POTASSIUM CHLORIDE 20 MEQ: 1500 TABLET, EXTENDED RELEASE ORAL at 09:35

## 2022-05-28 RX ADMIN — CLOPIDOGREL BISULFATE 75 MG: 75 TABLET ORAL at 09:06

## 2022-05-28 RX ADMIN — LABETALOL HYDROCHLORIDE 50 MG: 100 TABLET, FILM COATED ORAL at 09:07

## 2022-05-28 RX ADMIN — PRAVASTATIN SODIUM 40 MG: 40 TABLET ORAL at 17:58

## 2022-05-28 RX ADMIN — PREDNISONE 60 MG: 20 TABLET ORAL at 09:35

## 2022-05-28 RX ADMIN — GABAPENTIN 300 MG: 300 CAPSULE ORAL at 17:58

## 2022-05-28 NOTE — ASSESSMENT & PLAN NOTE
Patient presents with unilateral temporal headache with jaw pain (per nursing home this was first documented as severe requiring a tramadol on 5/27 however she did see rheumatologist for this problem in Jan 2022) now improved, no visual disturbance found with elevated , CRP 18 6 (compared to 78, 11 back in Jan 2022 and 99, 4 8 back in summer 2021) CT head no acute intracranial abnormality  No new focal deficits  · Patient has chronic OA pain as well as PAD for which she takes tramadol, tylenol and gabapentin for SCALP PAIN indication 900 mg tid and she has been on prednisone taper since last summer with low dose 2mg daily for "LEFT PARIETAL PAIN" per nursing home  No record of formal diagnosis of temporal arteritis in the past   · Of note she has not been on that dose of gabapentin since IP, I will change it back to the max dose based on renal clearance which is 900 mg max per day in 2-3 divided doses  · I think this is a chronic issue with possibly dementia/cognitive changes in the elderly contributing to her "creeping" pain" sensation in the left face difficult to make a timeline however because of danger for temporal arteritis will continue steroid therapy and defer to vascular surgery for bx for definitive diagnosis ASAP I will message them to please schedule her immediately  · patient received methylprednisolone 1 g in ED, will continue prednisone 60 mg qd for at least 2 weeks then can resume tapering  · will need temporal artery biopsy, vascular ambulatory referral placed in the dc tab  · CRP downtrended  · analgesic and antiemetic p r n

## 2022-05-28 NOTE — ED NOTES
Spoke with Alex Lopez MS3 RN  Pt in agreement to receive patient        Lisa Singletary  05/27/22 2053

## 2022-05-28 NOTE — ASSESSMENT & PLAN NOTE
· H/o CVA with left hemiparesis/hemiplegia in 2019, wheelchair-bound  · Baclofen prn muscle spasms  · continue Plavix, statin

## 2022-05-28 NOTE — DISCHARGE INSTR - AVS FIRST PAGE
Dear Troy Harrell,     It was our pleasure to care for you here at [facility]  At this time we provide for you here, the most important instructions / recommendations at discharge:     Notable Medication Adjustments -   Prednisone 60 mg daily for at least 2 weeks but will need taper to be decided by the vascular surgeon or pcp  Protonix 40 mg daily to protect the stomach while she is on the high dose prednisone  Gabapentin dosing for her creatinine clearance should be gabapentin po 300 mg 3 times a day  Added senna for constipation prn  Testing Required after Discharge -   Needs temporal artery biopsy to be completed by vascular surgeon their office will call to arrange follow up asap  Important follow up information -   Vascular surgery will call to arrange follow up  Also would benefit from a psychiatry visit either in person or telemedicine  Other Instructions -   None  Please review this entire after visit summary as additional general instructions including medication list, appointments, activity, diet, any pertinent wound care, and other additional recommendations from your care team that may be provided for you        Sincerely,     Valeria Allen PA-C

## 2022-05-28 NOTE — CASE MANAGEMENT
Case Management Discharge Planning Note    Patient name Jose Acevedo  Location [address] 328/-[id number] MRN [id number]  : 1933 Date 2022       Current Admission Date: 2022  Current Admission Diagnosis:Temporal arteritis [facility])   Patient Active Problem List    Diagnosis Date Noted    Hemip[facility]ia ([facility] [address] ) 2022    Hyperlipidemia 2022    Hypothyroidism 2022    Schizophrenia ([facility] [address] ) 2022    Osteoarthritis 2022    Kidney disorder 2022    Primary hypertension 2022    Temporal arteritis ([facility] [address] ) 2022    Overactive bladder 2022    Multiple stable closed lateral compression fractures of pelvis ([facility] ) 10/13/2021      LOS (days): 1  Geometric Mean LOS (GMLOS) (days):   Days to GMLOS:     OBJECTIVE:  Risk of Unplanned Readmission Score: 9 42         Current admission status: Inpatient   Preferred Pharmacy:   [address] [facility], [address] [facility]  [facility] [address] [place] [address]  Phone: [phone] Fax: [phone]    Primary Care Provider: Svetlana Ta DO    Primary Insurance: MEDICARE  Secondary Insurance: [address] [place] [address]    DISCHARGE DETAILS:    Discharge planning discussed with[de-identified] pt, son, PVM  Freedom of Choice: Yes  Comments - Freedom of Choice: [facility]  CM contacted family/caregiver?: Yes  Were Treatment Team discharge recommendations reviewed with patient/caregiver?: Yes  Did patient/caregiver verbalize understanding of patient care needs?: Yes  Were patient/caregiver advised of the risks associated with not following Treatment Team discharge recommendations?: Yes    Contacts  Patient Contacts: Jitendra Dillard  Relationship to Patient[de-identified] Family  Contact Method:  In Person  Reason/Outcome: Discharge [address]         Is the patient interested in Chasidyericka Bassett at discharge?: No    DME Referral Provided  Referral made for DME?: No    Other Referral/Resources/Interventions Provided:  Interventions: SNF  Referral Comments: Pt is a long term resident at [facility] and they will accept back tonight  Are agreeable to 21:00    Med Nec done    Would you like to participate in our [address] service program?  : No - Declined    Treatment Team Recommendation: SNF  Discharge Destination Plan[de-identified] SNF  Transport at Discharge : [facility]  Dispatcher Contacted: Yes     Transported by (Company and Unit #): [facility]  ETA of Transport (Date): 05/28/22  ETA of Transport (Time): 2100                            Accepting Facility Name, [address] : [facility]  Receiving Facility/Agency Phone Number: [phone] ext:  [phone]  Facility/Agency Fax Number: [phone]

## 2022-05-28 NOTE — TELEMEDICINE
TeleConsultation - [address] 80 y o  female MRN: [id number]  Unit/Bed#: -01 Encounter: [id number]        REQUIRED DOCUMENTATION:     1  This service was provided via Telemedicine  2  Provider located at [place]  3  TeleMed provider: Jose Manuel Chanel MD   4  Identify all parties in room with patient during tele consult:  Patient  Son was present via speaker phone  5  Patient was then informed that this was a Telemedicine visit and that the exam was being conducted confidentially over secure lines  My office door was closed  No one else was in the room  Patient acknowledged consent and understanding of privacy and security of the Telemedicine visit, and gave us permission to have the assistant stay in the room in order to assist with the history and to conduct the exam   I informed the patient that I have reviewed their record in Epic and presented the opportunity for them to ask any questions regarding the visit today  The patient agreed to participate  Assessment/Plan     Assessment:  Anxiety disorder unspecified; mood disorder unspecified  Plan:   Risks, benefits and possible side effects of Medications:   Risks, benefits, and possible side effects of medications explained to patient and patient verbalizes understanding  Although the patient denies depression anxiety, she does have history of depression anxiety as time has observed behavioral changes as psychiatric medications for depression anxiety discontinued nursing facility  Recommend trial of Cymbalta starting with 20 mg p o  every morning for depression, anxiety and pain syndrome  Patient and son are in agreement with this plan  Recommend follow-up with Psychiatry the nursing home facility upon return there  Re-consult Psychiatry as needed      Chief Complaint:  Pain in the left side of my face    History of Present Illness     Reason for Consult / Principal Problem:  Schizophrenia, 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    Patient is a 80 y o  female who presented to the hospital where provider has documented the following: Claudette Little is a 80 y o  female with a PMH of HTN, HLD, CVA with left residual hemiparesis in 1994 who presents [facility] for evaluation of left temporal headache for the past few days  She reports constant sharp to pressure left temporal pain associated with left jaw pain this morning now resolved  She had nausea 1 week ago and constipation  She denies blurry vision, new weakness, fever, chills, vomiting, cough, chest pain, palpitation, shortness of breath or history of headaches  [place]     Son reports that he has observed behavioral changes the patient has been concerned that she has had recurrence of depression anxiety  He reports the nursing home facility has stopped her psychiatric medications and since then he has observed behavioral changes which she believes is increased depression and anxiety  He is not sure with her medication regimen previously this is the night  Past psychiatric history:  Reportedly the patient has history of schizophrenia  Son reports the patient has history of depression anxiety  Social history:  The patient is residing in a nursing home facility  No history of psychological trauma is reported  Family history:  Unable to obtain information presently  Substance use history:  Is reported the patient has no history of substance use  Mental status examination:  The patient is alert and oriented x4  She appears to have some difficulty in articulation therefore is difficult to understand at times  Sensorium appears clear  Thought process is logical linear  Thought content is reality based  Associations were tight  Memory is grossly intact in all spheres  No memory deficits have been observed  She appears to be of average intellectual functioning judging by her use of vocabulary, sentence structure and syntax    She denies suicidal homicidal ideation  No hallucinations or other psychotic features were elicited  Insight and judgment appear to be baseline at this time  She is motivated for psychiatric treatment  Inpatient consult to Psychiatry  Consult performed by: Camille Hollis MD  Consult ordered by: Morgan Valentino PA-C          Past Medical History:   Diagnosis Date    Hemip[facility]ia ([facility] [address] )     Hyperlipidemia     Hypothyroidism     Kidney disorder     Osteoarthritis     Schizophrenia ([facility] )     Vascular disease        Medical Review Of Systems:  Review of Systems    Meds/Allergies   all current active meds have been reviewed  No Known Allergies    Objective   Vital signs in last 24 hours:  Temp:  [97 9 °F (36 6 °C)-98 5 °F (36 9 °C)] 98 °F (36 7 °C)  HR:  [] 95  Resp:  [15-21] 20  BP: (136-172)/(61-99) 160/79      Intake/Output Summary (Last 24 hours) at 5/28/2022 1719  Last data filed at 5/28/2022 1245  Gross per 24 hour   Intake 420 ml   Output --   Net 420 ml         Lab Results:  Reviewed  Imaging Studies:  Review  EKG, Pathology, and Other Studies:  Review    Code Status: Level 1 - Full Code  Advance Directive and Living Will:      Power of :    POLST:      Counseling / Coordination of Care  Total floor / unit time spent today 30 minutes  Greater than 50% of total time was spent with the patient and / or family counseling and / or coordination of care  A description of the counseling / coordination of care:  Chart review, patient evaluation, coordination communication with staff, nursing and provider

## 2022-05-28 NOTE — DISCHARGE SUMMARY
[facility]  Discharge- Ana Tejada 1933, 80 y o  female MRN: [id number]  Unit/Bed#: -01 Encounter: [id number]  Primary Care Provider: Polo Navarro DO   Date and time admitted to hospital: 5/27/2022 11:05 AM    * Temporal arteritis [facility])  Assessment & Plan  Patient presents with unilateral temporal headache with jaw pain (per nursing home this was first documented as severe requiring a tramadol on 5/27 however she did see rheumatologist for this problem in Jan 2022) now improved, no visual disturbance found with elevated , CRP 18 6 (compared to 78, 11 back in Jan 2022 and 99, 4 8 back in summer 2021) CT head no acute intracranial abnormality  No new focal deficits  · Patient has chronic OA pain as well as PAD for which she takes tramadol, tylenol and gabapentin for SCALP PAIN indication 900 mg tid and she has been on prednisone taper since last summer with low dose 2mg daily for "LEFT PARIETAL PAIN" per nursing home  No record of formal diagnosis of temporal arteritis in the past   · Of note she has not been on that dose of gabapentin since IP, I will change it back to the max dose based on renal clearance which is 900 mg max per day in 2-3 divided doses  · I think this is a chronic issue with possibly dementia/cognitive changes in the elderly contributing to her "creeping" pain" sensation in the left face difficult to make a timeline however because of danger for temporal arteritis will continue steroid therapy and defer to vascular surgery for bx for definitive diagnosis ASAP I will message them to please schedule her immediately  · patient received methylprednisolone 1 g in ED, will continue prednisone 60 mg qd for at least 2 weeks then can resume tapering  · will need temporal artery biopsy, vascular ambulatory referral placed in the dc tab  · CRP downtrended  · analgesic and antiemetic p r n      Overactive bladder  Assessment & Plan  · Mirabegron not available, will give oxybutynin    Primary hypertension  Assessment & Plan  · will continue labetalol  · quinapril not available on formulary, will give lisinopril 40 mg daily    Osteoarthritis  Assessment & Plan  · continue tramadol and acetaminophen p r n  Schizophrenia ([facility] )  Assessment & Plan  · Per son there was depression and psychosis after her stroke years ago and she saw a psychiatrist, trialed medications and then was weaned off of the psychiatric meds once she was placed in the nursing home  He has noticed personality/mood changes since the weaning and brought it up at care conferences  · Nursing home staff doesn't know the last time she saw a psychiatrist, I will reconsult today for telemed visit for fear of poor op follow up, if this is not completed by the time of her discharge this should be a priority for the nursing facility  · Last antipsychotic mentioned in the chart from the nursing facility was Zyprexa although that medication is not on her current med list   · No acute SI/HI or hallucinations    Hypothyroidism  Assessment & Plan  · continue levothyroxine      Hyperlipidemia  Assessment & Plan  · continue statin    Hemiplegia ([facility] )  Assessment & Plan  · H/o CVA with left hemiparesis/hemiplegia in 2019, wheelchair-bound  · Baclofen prn muscle spasms  · continue Plavix, statin      Medical Problems             Resolved Problems  Date Reviewed: 5/27/2022   None               Discharging Physician / Practitioner: Paul Arredondo PA-C  PCP: Lan Gleason DO  Admission Date:   Admission Orders (From admission, onward)     Ordered        05/27/22 1310  Inpatient Admission  Once                      Discharge Date: 05/28/22    Consultations During Hospital Stay:  · cm    Procedures Performed:   · none    Significant Findings / Test Results:   CT head without contrast   Final Result by Fletcher Perera DO (05/27 1217)      No acute intracranial abnormality    Stable microangiopathic changes within the brain when compared with prior MRI from 12/22/2021                    Workstation performed: [id number]           Results Reviewed     Procedure Component Value Units Date/Time    C-reactive protein [[id number]]  (Abnormal) Collected: 05/28/22 0525    Lab Status: Final result Specimen: Blood from Arm, Left Updated: 05/28/22 0634     CRP 12 4 mg/L     Basic metabolic panel [[id number]]  (Abnormal) Collected: 05/28/22 0525    Lab Status: Final result Specimen: Blood from Arm, Left Updated: 05/28/22 7202     Sodium 137 mmol/L      Potassium 3 4 mmol/L      Chloride 98 mmol/L      CO2 26 mmol/L      ANION GAP 13 mmol/L      BUN 20 mg/dL      Creatinine 0 81 mg/dL      Glucose 134 mg/dL      Calcium 9 1 mg/dL      eGFR 64 ml/min/1 73sq m     Narrative:      [facility] guidelines for Chronic Kidney Disease (CKD):     Stage 1 with normal or high GFR (GFR > 90 mL/min/1 73 square meters)    Stage 2 Mild CKD (GFR = 60-89 mL/min/1 73 square meters)    Stage 3A Moderate CKD (GFR = 45-59 mL/min/1 73 square meters)    Stage 3B Moderate CKD (GFR = 30-44 mL/min/1 73 square meters)    Stage 4 Severe CKD (GFR = 15-29 mL/min/1 73 square meters)    Stage 5 End Stage CKD (GFR <15 mL/min/1 73 square meters)  Note: GFR calculation is accurate only with a steady state creatinine    Platelet count [[id number]]  (Normal) Collected: 05/27/22 2241    Lab Status: Final result Specimen: Blood from Arm, Left Updated: 05/27/22 2250     Platelets 658 Thousands/uL      MPV 9 9 fL     Fingerstick Glucose (POCT) [[id number]]  (Normal) Collected: 05/27/22 1740    Lab Status: Final result Updated: 05/27/22 1747     POC Glucose 129 mg/dl     C-reactive protein [[id number]]  (Abnormal) Collected: 05/27/22 1205    Lab Status: Final result Specimen: Blood from Arm, Left Updated: 05/27/22 1313     CRP 18 6 mg/L     COVID/FLU/RSV - 2 hour TAT [[id number]]  (Normal) Collected: 05/27/22 1205    Lab Status: Final result Specimen: Nares from Nose Updated: 05/27/22 1251     SARS-CoV-2 Negative     INFLUENZA A PCR Negative     INFLUENZA B PCR Negative     RSV PCR Negative    Narrative:      FOR PEDIATRIC PATIENTS - copy/paste COVID Guidelines URL to browser: [email or web address]    SARS-CoV-2 assay is a Nucleic Acid Amplification assay intended for the  qualitative detection of nucleic acid from SARS-CoV-2 in nasopharyngeal  swabs  Results are for the presumptive identification of SARS-CoV-2 RNA  Positive results are indicative of infection with SARS-CoV-2, the virus  causing COVID-19, but do not rule out bacterial infection or co-infection  with other viruses  Laboratories within the [place] and its  territories are required to report all positive results to the appropriate  public health authorities  Negative results do not preclude SARS-CoV-2  infection and should not be used as the sole basis for treatment or other  patient management decisions  Negative results must be combined with  clinical observations, patient history, and epidemiological information  This test has not been FDA cleared or approved  This test has been authorized by FDA under an Emergency Use Authorization  (EUA)  This test is only authorized for the duration of time the  declaration that circumstances exist justifying the authorization of the  emergency use of an in vitro diagnostic tests for detection of SARS-CoV-2  virus and/or diagnosis of COVID-19 infection under section 564(b)(1) of  the Act, 21 U  S C  391YHX-1(C)(3), unless the authorization is terminated  or revoked sooner  The test has been validated but independent review by FDA  and CLIA is pending  Test performed using [employer] GeneXpert: This RT-PCR assay targets N2,  a region unique to SARS-CoV-2  A conserved region in the E-gene was chosen  for pan-Sarbecovirus detection which includes SARS-CoV-2      Protime-INR [[id number]]  (Normal) Collected: 05/27/22 1205    Lab Status: Final result Specimen: Blood from Arm, Left Updated: 05/27/22 1234     Protime 13 1 seconds      INR 1 03    APTT [[id number]]  (Normal) Collected: 05/27/22 1205    Lab Status: Final result Specimen: Blood from Arm, Left Updated: 05/27/22 1234     PTT 33 seconds     Basic metabolic panel [[id number]]  (Abnormal) Collected: 05/27/22 1205    Lab Status: Final result Specimen: Blood from Arm, Left Updated: 05/27/22 1233     Sodium 139 mmol/L      Potassium 3 7 mmol/L      Chloride 99 mmol/L      CO2 31 mmol/L      ANION GAP 9 mmol/L      BUN 18 mg/dL      Creatinine 0 88 mg/dL      Glucose 106 mg/dL      Calcium 9 2 mg/dL      eGFR 58 ml/min/1 73sq m     Narrative:      [facility] guidelines for Chronic Kidney Disease (CKD):     Stage 1 with normal or high GFR (GFR > 90 mL/min/1 73 square meters)    Stage 2 Mild CKD (GFR = 60-89 mL/min/1 73 square meters)    Stage 3A Moderate CKD (GFR = 45-59 mL/min/1 73 square meters)    Stage 3B Moderate CKD (GFR = 30-44 mL/min/1 73 square meters)    Stage 4 Severe CKD (GFR = 15-29 mL/min/1 73 square meters)    Stage 5 End Stage CKD (GFR <15 mL/min/1 73 square meters)  Note: GFR calculation is accurate only with a steady state creatinine    Sedimentation rate, automated [[id number]]  (Abnormal) Collected: 05/27/22 1205    Lab Status: Final result Specimen: Blood from Arm, Left Updated: 05/27/22 1230     Sed Rate 117 mm/hour     CBC and differential [[id number]]  (Abnormal) Collected: 05/27/22 1205    Lab Status: Final result Specimen: Blood from Arm, Left Updated: 05/27/22 1213     WBC 7 50 Thousand/uL      RBC 4 48 Million/uL      Hemoglobin 12 4 g/dL      Hematocrit 39 6 %      MCV 88 fL      MCH 27 7 pg      MCHC 31 3 g/dL      RDW 15 3 %      MPV 10 3 fL      Platelets 609 Thousands/uL      nRBC 0 /100 WBCs      Neutrophils Relative 61 %      Immat GRANS % 0 %      Lymphocytes Relative 26 %      Monocytes Relative 9 % Eosinophils Relative 3 %      Basophils Relative 1 %      Neutrophils Absolute 4 66 Thousands/µL      Immature Grans Absolute 0 03 Thousand/uL      Lymphocytes Absolute 1 91 Thousands/µL      Monocytes Absolute 0 66 Thousand/µL      Eosinophils Absolute 0 20 Thousand/µL      Basophils Absolute 0 04 Thousands/µL             Incidental Findings:   · none     Test Results Pending at Discharge (will require follow up):   · none     Outpatient Tests Requested:  · Temporal artery biopsy - vascular surgery scheduler is aware of this patient    Complications:  none    Reason for Admission: headache/jaw pain left side    Hospital Course:   Luther Chase is a 80 y o  female patient with pmh stroke with hemiplegia, ambulatoy dysfunction, obesity, psychiatric disorder, HTN, hld, overactive bladder, peripheral vascular disease who originally presented to the hospital on 5/27/2022 due to left facial/jaw pain and headache  This is acute on chronic issue has been seen for suspicion for GCA and treated with [prednisone taper in the past  CT H negative for acute issues  No focal changes beyond baseline hemiplegia from prior cva  There is complaint of pain all over likely OA as well as muscle spasm  Her time line is unreliable and needs to be evaluated by psychiatry  Will continue higher dose steroid on dc because of continually elevated ESR & CRP and suspicion for ongoing temporal arteritis which needs definitive diagnosis from vascular surgery  They are aware of this patient  Continue other home meds  Add protonix for GI protection with steroid use  Patient and her son have had all of their questions answered and are amenable to her discharge to [facility] today  Please see above list of diagnoses and related plan for additional information  Condition at Discharge: stable    Discharge Day Visit / Exam:   Subjective:  Continued left temporal area pain as well as bl ankle spasms   Patient also complaining of neck stiffness for which she was given tylenol  Vitals: Blood Pressure: 160/79 (05/28/22 1411)  Pulse: 95 (05/28/22 1411)  Temperature: 98 °F (36 7 °C) (05/28/22 1411)  Temp Source: Oral (05/27/22 1146)  Respirations: 20 (05/28/22 1411)  Height: 5' 6" (167 6 cm) (05/28/22 1301)  Weight - Scale: 98 2 kg (216 lb 7 9 oz) (05/28/22 1409)  SpO2: 95 % (05/28/22 1411)  Exam:   Physical Exam  Vitals and nursing note reviewed  Constitutional:       Comments: Hemiplegia with flaccid right side and facial droop   HENT:      Head: Normocephalic and atraumatic  Nose: Nose normal       Mouth/Throat:      Mouth: Mucous membranes are moist    Eyes:      Conjunctiva/sclera: Conjunctivae normal    Cardiovascular:      Rate and Rhythm: Normal rate and regular rhythm  Pulses: Normal pulses  Heart sounds: Normal heart sounds  Genitourinary:     Comments: Pure wick  Musculoskeletal:      Cervical back: Muscular tenderness present  Comments: Left lower extremity TTP calf, shin and ankle, there is spasm   Skin:     General: Skin is warm  Comments: Extremely dry skin bl feet with onychomycosis toenails  Hyperpigmentation of venous insufficiency no wounds I can see  Temperature is equal both legs   Neurological:      Mental Status: She is alert  Mental status is at baseline  Comments: This is her baseline for the last few months per son   Psychiatric:         Mood and Affect: Mood normal          Behavior: Behavior normal       Comments: ? Cognitive impairment  No hallucinations          Discussion with Family: Updated  (son) at bedside  Discharge instructions/Information to patient and family:   See after visit summary for information provided to patient and family  Provisions for Follow-Up Care:  See after visit summary for information related to follow-up care and any pertinent home health orders         Disposition:   Other: pvm long term    Planned Readmission: none     Discharge Statement:  I spent 45 minutes discharging the patient  This time was spent on the day of discharge  I had direct contact with the patient on the day of discharge  Greater than 50% of the total time was spent examining patient, answering all patient questions, arranging and discussing plan of care with patient as well as directly providing post-discharge instructions  Additional time then spent on discharge activities  Discharge Medications:  See after visit summary for reconciled discharge medications provided to patient and/or family        **Please Note: This note may have been constructed using a voice recognition system**

## 2022-05-28 NOTE — ASSESSMENT & PLAN NOTE
· Per son there was depression and psychosis after her stroke years ago and she saw a psychiatrist, trialed medications and then was weaned off of the psychiatric meds once she was placed in the nursing home  He has noticed personality/mood changes since the weaning and brought it up at care conferences  · Nursing home staff doesn't know the last time she saw a psychiatrist, I will reconsult today for telemed visit for fear of poor op follow up, if this is not completed by the time of her discharge this should be a priority for the nursing facility    · Last antipsychotic mentioned in the chart from the nursing facility was Zyprexa although that medication is not on her current med list   · No acute SI/HI or hallucinations

## 2022-05-31 ENCOUNTER — TELEPHONE (OUTPATIENT)
Dept: VASCULAR SURGERY | Facility: CLINIC | Age: 87
End: 2022-05-31

## 2022-05-31 NOTE — TELEPHONE ENCOUNTER
Verified patient's insurance   CONFIRMED - Patient's insurance is Medicare  Is patient requesting a call when authorization has been obtained? Patient did not request a call  Surgery Date: 6/3/22  Primary Surgeon: Km Swift // Radha Scales (NPI: [id number])  Assisting Surgeon: Not Applicable (N/A)  Facility: [facility] (Tax: [id number] / NPI: [id number])  Inpatient / Outpatient: Outpatient  Level: 1    Clearance Received: No clearance ordered  Consent Received: Consent will be signed day of procedure  Medication Hold / Last Dose: Not Applicable (N/A)  VQI Spreadsheet: Not Applicable (N/A)  IR Notified: Not Applicable (N/A)  Rep   Notified: Not Applicable (N/A)  Equipment Needs: Not Applicable (N/A)  Vas Lab Requested: Not Applicable (N/A)  Patient Contacted: S/w pt's nurse Marti Reis and then s/w transport     Diagnosis: M31 6  Procedure/ CPT Code(s): Temporal Artery Biopsy CPT: 59054    For varicose vein related procedures:   Last LEVDR: Not Applicable (N/A)  CEAP Classification: Not Applicable (N/A)  VCSS: Not Applicable (N/A)    Post Operative Date/ Time: To Be Determined (TBD)

## 2022-05-31 NOTE — TELEPHONE ENCOUNTER
From: [email or web address]   Sent: Saturday, May 28, 2022 10:09 AM  To: Vascular Surgery Schedulers [email or web address]  Subject: Ulisesruthann Stefania [birth year]    Hello,    This patient will need to be scheduled for outpatient temporal artery biopsy

## 2022-05-31 NOTE — TELEPHONE ENCOUNTER
Authorization requirements reviewed  Please refer to [address] / Filipe Petite number [id number] for case updates        No authorization required

## 2022-06-01 NOTE — PHYSICIAN ADVISOR
Current patient class: Inpatient  The patient is currently on Hospital Day: 2 at [email or web address]      The patient was admitted to the hospital  on 5/27/22 at  1:10 PM for the following diagnosis:  Temporal arteritis ([address] ) [M31 6]  Chest pain [R07 9]     After review of the relevant documentation, labs, vital signs and test results, this is a PROVIDER LIABLE case  In this particular case the patient was admitted to the hospital as an inpatient  The patient however failed to satisfy the 2 midnight benchmark and closer scrutiny of the case was warranted  After review of the patient presentation and relevant labs the patient was most appropriate for observation or outpatient class at the time of admission  Given that this patient has already been discharged prior to this review they become a provider liable case  Rationale is as follows: The patient is a 80 yrs   Female who presented to the ED at 5/27/2022 11:05 AM with a chief complaint of Headache (Pt reports left sided headache  History of stroke, per SNF staff pt is at her baseline ) Pt with h/o schizophrenia and suspected temporal arteritis on prednisone per home meds, admitted for left sided headache  SHe received I dose solumedrol IV and prednisone dose increased  She will follow up with vascular surgery as outpatient  Given her stability OBS seemed appropriate and I recommend part b correction        The patients vitals on arrival were   ED Triage Vitals   Temperature Pulse Respirations Blood Pressure SpO2   05/27/22 1146 05/27/22 1112 05/27/22 1112 05/27/22 1112 05/27/22 1112   98 5 °F (36 9 °C) 84 18 169/68 94 %      Temp Source Heart Rate Source Patient Position - Orthostatic VS BP Location FiO2 (%)   05/27/22 1146 05/27/22 1112 05/27/22 1112 05/27/22 1112 --   Oral Monitor Lying Left arm       Pain Score       05/27/22 1112       4           Past Medical History:   Diagnosis Date    Hemiplegia ([address] )     Hyperlipidemia     Hypothyroidism     Kidney disorder     Osteoarthritis     Sch[facility] ([facility]     Vascular disease      Past Surgical History:   Procedure Laterality Date    CATARACT EXTRACTION      FRACTURE SURGERY             Consults have been placed to:   IP CONSULT TO PSYCHIATRY    Vitals:    05/28/22 1101 05/28/22 1301 05/28/22 1409 05/28/22 1411   BP: 157/79   160/79   BP Location:       Pulse: 101   95   Resp: 18   20   Temp: 98 1 °F (36 7 °C)   98 °F (36 7 °C)   TempSrc:       SpO2: 94%   95%   Weight:   98 2 kg (216 lb 7 9 oz)    Height:  5' 6" (1 676 m)         Most recent labs:    No results for input(s): WBC, HGB, HCT, PLT, K, NA, CALCIUM, BUN, CREATININE, LIPASE, AMYLASE, INR, TROPONINI, CKTOTAL, AST, ALT, ALKPHOS, BILITOT in the last 72 hours  Scheduled Meds:  Continuous Infusions:No current facility-administered medications for this encounter  PRN Meds:      Surgical procedures (if appropriate):

## 2022-06-02 ENCOUNTER — ANESTHESIA EVENT (OUTPATIENT)
Dept: PERIOP | Facility: HOSPITAL | Age: 87
End: 2022-06-02
Payer: MEDICARE

## 2022-06-02 NOTE — ANESTHESIA PREPROCEDURE EVALUATION
Procedure:  BIOPSY ARTERY TEMPORAL (Bilateral Head)    Relevant Problems   CARDIO   (+) Hyperlipidemia   (+) Primary hypertension      ENDO   (+) Hypothyroidism      /RENAL   (+) Kidney disorder      MUSCULOSKELETAL   (+) Osteoarthritis      NEURO/PSYCH   (+) Hemiplegia (HCC)   (+) Schizophrenia (HCC)      Lab Results   Component Value Date    WBC 7 50 05/27/2022    HGB 12 4 05/27/2022    HCT 39 6 05/27/2022    MCV 88 05/27/2022     05/27/2022     Lab Results   Component Value Date    CALCIUM 9 1 05/28/2022    K 3 4 (L) 05/28/2022    CO2 26 05/28/2022    CL 98 (L) 05/28/2022    BUN 20 05/28/2022    CREATININE 0 81 05/28/2022     Lab Results   Component Value Date    INR 1 03 05/27/2022    PROTIME 13 1 05/27/2022     Lab Results   Component Value Date    PTT 33 05/27/2022       Physical Exam    Airway  Comment: Limited ability to participate in airway exam  Mallampati score: II  TM Distance: <3 FB       Dental   upper dentures and lower dentures,     Cardiovascular  Rhythm: regular, Rate: normal,     Pulmonary  Comment: Speaking in full sentences, not tachypneic, normal work of breathing,     Other Findings  R hemiplegia      Anesthesia Plan  ASA Score- 3     Anesthesia Type- general with ASA Monitors  Additional Monitors:   Airway Plan: LMA  Plan Factors-Exercise tolerance (METS): <4 METS  Chart reviewed  Imaging results reviewed  Existing labs reviewed  Patient is not a current smoker  Induction- intravenous  Postoperative Plan-     Informed Consent- Anesthetic plan and risks discussed with patient and son  Discussed risks and benefits of anesthesia from patient's son, Dalia Cannon ([phone])  In particular we discussed the elevated risks of anethesia given the patient's age and comorbidities  He gives consent to proceed

## 2022-06-02 NOTE — PRE-PROCEDURE INSTRUCTIONS
Pre-Surgery Instructions:   Medication Instructions    acetaminophen (TYLENOL) 325 mg tablet Uses PRN- OK to take day of surgery    alendronate (FOSAMAX) 70 mg tablet N/A-taken every Tues   amLODIPine (NORVASC) 10 mg tablet Take day of surgery   Calcium Carbonate-Vitamin D 600-400 MG-UNIT per tablet Hold day of surgery   clopidogrel (PLAVIX) 75 mg tablet As instructed by Surgeon/Prescribing MD  No hold-may take day of surgery   Diclofenac Sodium (VOLTAREN) 1 % Stop taking 1 day prior to surgery   DULoxetine (CYMBALTA) 20 mg capsule Take day of surgery   FeroSul 325 (65 Fe) MG tablet Hold day of surgery   folic acid (FOLVITE) 1 mg tablet Hold day of surgery   gabapentin (NEURONTIN) 300 mg capsule Take day of surgery   labetalol (NORMODYNE) 100 mg tablet Take day of surgery   levothyroxine 100 mcg tablet Take day of surgery   meloxicam (MOBIC) 15 mg tablet Stop taking prior to Surgery-as of today 6/2/22    meloxicam (MOBIC) 7 5 mg tablet Stop taking prior to Surgery-as of today, 6/2/22    Myrbetriq 25 MG TB24 Hold day of surgery   pantoprazole (PROTONIX) 20 mg tablet Take day of surgery   pravastatin (PRAVACHOL) 20 mg tablet Take day of surgery   predniSONE 20 mg tablet Take day of surgery   quinapril (ACCUPRIL) 40 MG tablet Hold day of surgery   risedronate (ACTONEL) 35 mg tablet Hold day of surgery   senna (SENOKOT) 8 6 mg Uses PRN- DO NOT take day of surgery    traMADol (ULTRAM) 50 mg tablet Uses PRN- OK to take day of surgery   Covid screening negative as per [address]  All medication and showering instructions Faxed to [facility]  Advised not to take any NSAID's, Vitamins or Herbal products prior to the DOS  Acetaminophen products are ok to take  Instructed about NPO after midnight the night before DOS, except sips of water with allowed medications on the morning of the DOS    All instructions faxed to [phone] Brendia Fabry RN on 6/2/2022

## 2022-06-03 ENCOUNTER — ANESTHESIA (OUTPATIENT)
Dept: PERIOP | Facility: HOSPITAL | Age: 87
End: 2022-06-03
Payer: MEDICARE

## 2022-06-03 ENCOUNTER — HOSPITAL ENCOUNTER (OUTPATIENT)
Facility: HOSPITAL | Age: 87
Setting detail: OUTPATIENT SURGERY
Discharge: NURSING, REHAB OR LONG-TERM CARE | End: 2022-06-03
Attending: SURGERY | Admitting: SURGERY
Payer: MEDICARE

## 2022-06-03 VITALS
RESPIRATION RATE: 16 BRPM | TEMPERATURE: 96 F | BODY MASS INDEX: 28.41 KG/M2 | HEIGHT: 67 IN | WEIGHT: 181 LBS | DIASTOLIC BLOOD PRESSURE: 89 MMHG | SYSTOLIC BLOOD PRESSURE: 189 MMHG | HEART RATE: 82 BPM | OXYGEN SATURATION: 95 %

## 2022-06-03 DIAGNOSIS — M31.6 TEMPORAL ARTERITIS (HCC): ICD-10-CM

## 2022-06-03 PROCEDURE — 88305 TISSUE EXAM BY PATHOLOGIST: CPT | Performed by: PATHOLOGY

## 2022-06-03 PROCEDURE — 37609 LIGATION/BX TEMPORAL ARTERY: CPT | Performed by: SURGERY

## 2022-06-03 RX ORDER — DEXAMETHASONE SODIUM PHOSPHATE 10 MG/ML
INJECTION, SOLUTION INTRAMUSCULAR; INTRAVENOUS AS NEEDED
Status: DISCONTINUED | OUTPATIENT
Start: 2022-06-03 | End: 2022-06-03

## 2022-06-03 RX ORDER — FENTANYL CITRATE/PF 50 MCG/ML
12.5 SYRINGE (ML) INJECTION
Status: DISCONTINUED | OUTPATIENT
Start: 2022-06-03 | End: 2022-06-03

## 2022-06-03 RX ORDER — MAGNESIUM HYDROXIDE 1200 MG/15ML
LIQUID ORAL AS NEEDED
Status: DISCONTINUED | OUTPATIENT
Start: 2022-06-03 | End: 2022-06-03 | Stop reason: HOSPADM

## 2022-06-03 RX ORDER — LIDOCAINE HYDROCHLORIDE 10 MG/ML
INJECTION, SOLUTION EPIDURAL; INFILTRATION; INTRACAUDAL; PERINEURAL AS NEEDED
Status: DISCONTINUED | OUTPATIENT
Start: 2022-06-03 | End: 2022-06-03 | Stop reason: HOSPADM

## 2022-06-03 RX ORDER — FENTANYL CITRATE 50 UG/ML
INJECTION, SOLUTION INTRAMUSCULAR; INTRAVENOUS AS NEEDED
Status: DISCONTINUED | OUTPATIENT
Start: 2022-06-03 | End: 2022-06-03

## 2022-06-03 RX ORDER — NALOXONE HYDROCHLORIDE 0.4 MG/ML
INJECTION, SOLUTION INTRAMUSCULAR; INTRAVENOUS; SUBCUTANEOUS AS NEEDED
Status: DISCONTINUED | OUTPATIENT
Start: 2022-06-03 | End: 2022-06-03

## 2022-06-03 RX ORDER — SODIUM CHLORIDE, SODIUM LACTATE, POTASSIUM CHLORIDE, CALCIUM CHLORIDE 600; 310; 30; 20 MG/100ML; MG/100ML; MG/100ML; MG/100ML
125 INJECTION, SOLUTION INTRAVENOUS CONTINUOUS
Status: DISCONTINUED | OUTPATIENT
Start: 2022-06-03 | End: 2022-06-03 | Stop reason: HOSPADM

## 2022-06-03 RX ORDER — PROPOFOL 10 MG/ML
INJECTION, EMULSION INTRAVENOUS AS NEEDED
Status: DISCONTINUED | OUTPATIENT
Start: 2022-06-03 | End: 2022-06-03

## 2022-06-03 RX ORDER — ROCURONIUM BROMIDE 10 MG/ML
INJECTION, SOLUTION INTRAVENOUS AS NEEDED
Status: DISCONTINUED | OUTPATIENT
Start: 2022-06-03 | End: 2022-06-03

## 2022-06-03 RX ORDER — ONDANSETRON 2 MG/ML
4 INJECTION INTRAMUSCULAR; INTRAVENOUS ONCE AS NEEDED
Status: DISCONTINUED | OUTPATIENT
Start: 2022-06-03 | End: 2022-06-03 | Stop reason: HOSPADM

## 2022-06-03 RX ORDER — ONDANSETRON 2 MG/ML
INJECTION INTRAMUSCULAR; INTRAVENOUS AS NEEDED
Status: DISCONTINUED | OUTPATIENT
Start: 2022-06-03 | End: 2022-06-03

## 2022-06-03 RX ORDER — SODIUM CHLORIDE, SODIUM LACTATE, POTASSIUM CHLORIDE, CALCIUM CHLORIDE 600; 310; 30; 20 MG/100ML; MG/100ML; MG/100ML; MG/100ML
INJECTION, SOLUTION INTRAVENOUS CONTINUOUS PRN
Status: DISCONTINUED | OUTPATIENT
Start: 2022-06-03 | End: 2022-06-03

## 2022-06-03 RX ORDER — LABETALOL HYDROCHLORIDE 5 MG/ML
INJECTION, SOLUTION INTRAVENOUS AS NEEDED
Status: DISCONTINUED | OUTPATIENT
Start: 2022-06-03 | End: 2022-06-03

## 2022-06-03 RX ORDER — ACETAMINOPHEN 325 MG/1
650 TABLET ORAL ONCE AS NEEDED
Status: DISCONTINUED | OUTPATIENT
Start: 2022-06-03 | End: 2022-06-03 | Stop reason: HOSPADM

## 2022-06-03 RX ORDER — LABETALOL HYDROCHLORIDE 5 MG/ML
10 INJECTION, SOLUTION INTRAVENOUS
Status: DISCONTINUED | OUTPATIENT
Start: 2022-06-03 | End: 2022-06-03 | Stop reason: HOSPADM

## 2022-06-03 RX ORDER — ACETAMINOPHEN 325 MG/1
650 TABLET ORAL ONCE
Status: COMPLETED | OUTPATIENT
Start: 2022-06-03 | End: 2022-06-03

## 2022-06-03 RX ORDER — KETAMINE HYDROCHLORIDE 50 MG/ML
INJECTION, SOLUTION, CONCENTRATE INTRAMUSCULAR; INTRAVENOUS AS NEEDED
Status: DISCONTINUED | OUTPATIENT
Start: 2022-06-03 | End: 2022-06-03

## 2022-06-03 RX ORDER — DIPHENHYDRAMINE HYDROCHLORIDE 50 MG/ML
12.5 INJECTION INTRAMUSCULAR; INTRAVENOUS ONCE AS NEEDED
Status: DISCONTINUED | OUTPATIENT
Start: 2022-06-03 | End: 2022-06-03 | Stop reason: HOSPADM

## 2022-06-03 RX ORDER — NALOXONE HYDROCHLORIDE 0.4 MG/ML
0.1 INJECTION, SOLUTION INTRAMUSCULAR; INTRAVENOUS; SUBCUTANEOUS
Status: DISCONTINUED | OUTPATIENT
Start: 2022-06-03 | End: 2022-06-03 | Stop reason: HOSPADM

## 2022-06-03 RX ADMIN — ACETAMINOPHEN 650 MG: 325 TABLET, FILM COATED ORAL at 14:52

## 2022-06-03 RX ADMIN — PHENYLEPHRINE HYDROCHLORIDE 60 MCG/MIN: 10 INJECTION INTRAVENOUS at 11:40

## 2022-06-03 RX ADMIN — PROPOFOL 100 MG: 10 INJECTION, EMULSION INTRAVENOUS at 11:26

## 2022-06-03 RX ADMIN — Medication 2000 MG: at 11:42

## 2022-06-03 RX ADMIN — DEXAMETHASONE SODIUM PHOSPHATE 4 MG: 10 INJECTION, SOLUTION INTRAMUSCULAR; INTRAVENOUS at 12:39

## 2022-06-03 RX ADMIN — SODIUM CHLORIDE, SODIUM LACTATE, POTASSIUM CHLORIDE, AND CALCIUM CHLORIDE: .6; .31; .03; .02 INJECTION, SOLUTION INTRAVENOUS at 11:19

## 2022-06-03 RX ADMIN — ROCURONIUM BROMIDE 50 MG: 50 INJECTION INTRAVENOUS at 11:35

## 2022-06-03 RX ADMIN — ONDANSETRON 4 MG: 2 INJECTION INTRAMUSCULAR; INTRAVENOUS at 12:41

## 2022-06-03 RX ADMIN — KETAMINE HYDROCHLORIDE 20 MG: 50 INJECTION, SOLUTION INTRAMUSCULAR; INTRAVENOUS at 11:26

## 2022-06-03 RX ADMIN — NALOXONE HYDROCHLORIDE 0.04 MG: 0.4 INJECTION, SOLUTION INTRAMUSCULAR; INTRAVENOUS; SUBCUTANEOUS at 13:10

## 2022-06-03 RX ADMIN — PROPOFOL 30 MG: 10 INJECTION, EMULSION INTRAVENOUS at 11:30

## 2022-06-03 RX ADMIN — SUGAMMADEX 200 MG: 100 INJECTION, SOLUTION INTRAVENOUS at 12:38

## 2022-06-03 RX ADMIN — KETAMINE HYDROCHLORIDE 20 MG: 50 INJECTION, SOLUTION INTRAMUSCULAR; INTRAVENOUS at 11:28

## 2022-06-03 RX ADMIN — KETAMINE HYDROCHLORIDE 10 MG: 50 INJECTION, SOLUTION INTRAMUSCULAR; INTRAVENOUS at 11:30

## 2022-06-03 RX ADMIN — PROPOFOL 20 MG: 10 INJECTION, EMULSION INTRAVENOUS at 11:28

## 2022-06-03 RX ADMIN — Medication 2.5 MG: at 11:49

## 2022-06-03 RX ADMIN — FENTANYL CITRATE 25 MCG: 0.05 INJECTION, SOLUTION INTRAMUSCULAR; INTRAVENOUS at 12:08

## 2022-06-03 RX ADMIN — SODIUM CHLORIDE, SODIUM LACTATE, POTASSIUM CHLORIDE, AND CALCIUM CHLORIDE 125 ML/HR: .6; .31; .03; .02 INJECTION, SOLUTION INTRAVENOUS at 10:52

## 2022-06-03 NOTE — INTERVAL H&P NOTE
H&P reviewed  After examining the patient I find no changes in the patients condition since the H&P had been written  There were no vitals filed for this visit  Concern for giant cell arteritis by medicine/neurology teams  Negative temporal artery duplex  Elevated ESR/CRP rates  Plan for bilateral temporal artery biopsy  All risks and benefits of the procedure were discussed with the patient's son/POA Mariola Horner and informed consent was obtained

## 2022-06-03 NOTE — ANESTHESIA POSTPROCEDURE EVALUATION
Post-Op Assessment Note    CV Status:  Stable    Pain management: satisfactory to patient     Mental Status:  Sleepy and arousable   Hydration Status:  Stable   PONV Controlled:  None   Airway Patency:  Patent   Two or more mitigation strategies used for obstructive sleep apnea   Post Op Vitals Reviewed: Yes      Staff: Anesthesiologist         No complications documented      BP  18/73   Temp   97 4   Pulse  67   Resp   26   SpO2   100 on nasal cannula     Orders entered for naloxone and labetalol PRN

## 2022-06-03 NOTE — OP NOTE
OPERATIVE REPORT  PATIENT NAME: Gisel Nair    :  1933  MRN: [id number]  Pt Location: [facility] OR ROOM 03    SURGERY DATE: 6/3/2022    Surgeon(s) and Role:     * Merissa Vasquez MD - Primary    Preop Diagnosis:  Temporal arteritis ([address] ) [M31 6]    Post-Op Diagnosis Codes:     * Temporal arteritis ([address] ) [M31 6]    Procedure(s) (LRB):  BIOPSY ARTERY TEMPORAL (Bilateral)    Specimen(s):  ID Type Source Tests Collected by Time Destination   1 : LEFT TEMPORAL Tissue Artery TISSUE EXAM Merissa Vasquez MD 6/3/2022 1202    2 : RIGHT TEMPORAL Tissue Artery TISSUE EXAM Merissa Vasquez MD 6/3/2022 1202        Estimated Blood Loss:   Minimal    Drains:  * No LDAs found *    Anesthesia Type:   Choice    Operative Indications:  Temporal arteritis ([address] ) [M31 6]    Ms Jakob Nelson is an 81yo female with concern for giant cell arteritis  Plan for bilateral temporal artery biopsy  All risks and benefits of the procedure were discussed with the son/POA and informed consent was obtained  Operative Findings:  Large, mild-moderately thickened temporal arteries bilaterally    Complications:   None    Procedure and Technique:    The patient was placed on the operating table in the supine position  General anesthesia/LMA was administered by anesthesia  She was prepped and draped in usual sterile fashion and a formal timeout was called  A 3 cm incision was made over each artery and a self-retaining retractor was placed  The arteries were then dissected from the surrounding subcutaneous tissue and mobilized sufficiently to place a Hemoclip proximally and distally on the artery  These were secured with 3-0 silk sutures  A 2-3 cm segment of artery was then excised and sent for formal pathologic examination  Hemostasis was secured in each incision and closure was then performed with 4-0 vicryl and 5-0 monocryl suture  1% lidocaine was then instilled along the incisions  Exofin glue was applied   The patient was transferred back to the ambulatory unit in stable condition  I was present for the entire procedure   No qualified surgical resident was available to assist       Patient Disposition:  PACU  and hemodynamically stable      SIGNATURE: Ce Pedro MD  DATE: Sophie 3, 2022  TIME: 1:12 PM

## 2022-06-03 NOTE — DISCHARGE INSTRUCTIONS
DISCHARGE INSTRUCTIONS  TEMPORAL ARTERY BIOPSY    ACTIVITY: Resume your normal activities and exercise schedule as tolerated  If you were driving prior to the procedure, you may resume driving the day after your surgery  DIET:  Resume your normal diet  Good nutrition is important for healing of your incision  INCISION: Your surgeon may have used surgical glue to close your incision  There are stitches present under the skin which will absorb on their own  The glue is used to cover the incision, assist in closure, and prevent contamination  This adhesive will darken and peel away on its own within one to two weeks  Wash incision daily with soap and water, but do not rub or scrub the incision; rinse thoroughly and pat dry  It is normal to have mild swelling or discoloration around the incision  If increasing redness or pain develops, call our office immediately  Numbness in the region of the incision may occur following the surgery  This normally improves over six to twelve months  DO NOT put any powders, creams, ointments, or lotions on your incision  FOLLOW UP STUDIES:  You should follow up with your PCP within 1-2 weeks for the results of the biopsy and to discuss further treatment recommendations  If you have increased pain, fever >101 5, increased drainage, redness or a bad smell at your surgery site, new coldness/numbness of your arm or leg, please call us immediately and GO directly to the ER  PLEASE CALL THE OFFICE IF YOU HAVE ANY QUESTIONS  [phone]  [phone] [address] [facility] , [address], [place], [address]  [address] [facility] [address], [address], [place], 210 [facility]  [address]   [address] [facility], [facility], [address] [facility]  [address] [place] [address], One [facility],[address], [facility], [address] [facility]  [address], 1st Floor, [address]  [facility], [address] [facility], [address] [facility], [facility], 830 [facility]  1307 [facility], 8614 [facility], 960 [facility]  One [facility], 1st Floor, [address] [facility], [address]  201 [facility], [facility], [place], [address]  [address] [place] [address], [address], [place] [address]

## 2022-09-20 ENCOUNTER — APPOINTMENT (OUTPATIENT)
Dept: RADIOLOGY | Facility: HOSPITAL | Age: 87
DRG: 377 | End: 2022-09-20
Payer: MEDICARE

## 2022-09-20 ENCOUNTER — HOSPITAL ENCOUNTER (INPATIENT)
Facility: HOSPITAL | Age: 87
LOS: 2 days | Discharge: NURSING, REHAB OR LONG-TERM CARE | DRG: 377 | End: 2022-09-23
Attending: EMERGENCY MEDICINE | Admitting: STUDENT IN AN ORGANIZED HEALTH CARE EDUCATION/TRAINING PROGRAM
Payer: MEDICARE

## 2022-09-20 DIAGNOSIS — I95.9 HYPOTENSION: Primary | ICD-10-CM

## 2022-09-20 DIAGNOSIS — N39.0 URINARY TRACT INFECTION: ICD-10-CM

## 2022-09-20 DIAGNOSIS — D64.9 ANEMIA: ICD-10-CM

## 2022-09-20 LAB
2HR DELTA HS TROPONIN: -4 NG/L
APTT PPP: 29 SECONDS (ref 23–37)
BACTERIA UR QL AUTO: ABNORMAL /HPF
BASOPHILS # BLD AUTO: 0.01 THOUSANDS/ΜL (ref 0–0.1)
BASOPHILS NFR BLD AUTO: 0 % (ref 0–1)
BILIRUB UR QL STRIP: NEGATIVE
CARDIAC TROPONIN I PNL SERPL HS: 24 NG/L
CARDIAC TROPONIN I PNL SERPL HS: 28 NG/L
CLARITY UR: ABNORMAL
COLOR UR: YELLOW
EOSINOPHIL # BLD AUTO: 0.01 THOUSAND/ΜL (ref 0–0.61)
EOSINOPHIL NFR BLD AUTO: 0 % (ref 0–6)
ERYTHROCYTE [DISTWIDTH] IN BLOOD BY AUTOMATED COUNT: 17.1 % (ref 11.6–15.1)
FLUAV RNA RESP QL NAA+PROBE: NEGATIVE
FLUBV RNA RESP QL NAA+PROBE: NEGATIVE
GLUCOSE UR STRIP-MCNC: NEGATIVE MG/DL
HCT VFR BLD AUTO: 29.5 % (ref 34.8–46.1)
HCT VFR BLD AUTO: 29.7 % (ref 34.8–46.1)
HGB BLD-MCNC: 9.1 G/DL (ref 11.5–15.4)
HGB BLD-MCNC: 9.2 G/DL (ref 11.5–15.4)
HGB UR QL STRIP.AUTO: ABNORMAL
IMM GRANULOCYTES # BLD AUTO: 0.23 THOUSAND/UL (ref 0–0.2)
IMM GRANULOCYTES NFR BLD AUTO: 3 % (ref 0–2)
INR PPP: 1.07 (ref 0.84–1.19)
KETONES UR STRIP-MCNC: NEGATIVE MG/DL
LACTATE SERPL-SCNC: 1.1 MMOL/L (ref 0.5–2)
LEUKOCYTE ESTERASE UR QL STRIP: ABNORMAL
LYMPHOCYTES # BLD AUTO: 1.05 THOUSANDS/ΜL (ref 0.6–4.47)
LYMPHOCYTES NFR BLD AUTO: 13 % (ref 14–44)
MCH RBC QN AUTO: 29.6 PG (ref 26.8–34.3)
MCHC RBC AUTO-ENTMCNC: 31 G/DL (ref 31.4–37.4)
MCV RBC AUTO: 96 FL (ref 82–98)
MONOCYTES # BLD AUTO: 0.41 THOUSAND/ΜL (ref 0.17–1.22)
MONOCYTES NFR BLD AUTO: 5 % (ref 4–12)
NEUTROPHILS # BLD AUTO: 6.14 THOUSANDS/ΜL (ref 1.85–7.62)
NEUTS SEG NFR BLD AUTO: 79 % (ref 43–75)
NITRITE UR QL STRIP: POSITIVE
NON-SQ EPI CELLS URNS QL MICRO: ABNORMAL /HPF
NRBC BLD AUTO-RTO: 2 /100 WBCS
PH UR STRIP.AUTO: 7 [PH]
PLATELET # BLD AUTO: 165 THOUSANDS/UL (ref 149–390)
PMV BLD AUTO: 11.1 FL (ref 8.9–12.7)
PROT UR STRIP-MCNC: ABNORMAL MG/DL
PROTHROMBIN TIME: 13.7 SECONDS (ref 11.6–14.5)
RBC # BLD AUTO: 3.11 MILLION/UL (ref 3.81–5.12)
RBC #/AREA URNS AUTO: ABNORMAL /HPF
RSV RNA RESP QL NAA+PROBE: NEGATIVE
SARS-COV-2 RNA RESP QL NAA+PROBE: NEGATIVE
SP GR UR STRIP.AUTO: 1.02 (ref 1–1.03)
UROBILINOGEN UR QL STRIP.AUTO: 1 E.U./DL
WBC # BLD AUTO: 7.85 THOUSAND/UL (ref 4.31–10.16)
WBC #/AREA URNS AUTO: ABNORMAL /HPF

## 2022-09-20 PROCEDURE — 85014 HEMATOCRIT: CPT

## 2022-09-20 PROCEDURE — 0241U HB NFCT DS VIR RESP RNA 4 TRGT: CPT | Performed by: PHYSICIAN ASSISTANT

## 2022-09-20 PROCEDURE — 80048 BASIC METABOLIC PNL TOTAL CA: CPT

## 2022-09-20 PROCEDURE — 99285 EMERGENCY DEPT VISIT HI MDM: CPT

## 2022-09-20 PROCEDURE — 84484 ASSAY OF TROPONIN QUANT: CPT | Performed by: PHYSICIAN ASSISTANT

## 2022-09-20 PROCEDURE — 71046 X-RAY EXAM CHEST 2 VIEWS: CPT

## 2022-09-20 PROCEDURE — 84484 ASSAY OF TROPONIN QUANT: CPT

## 2022-09-20 PROCEDURE — 87077 CULTURE AEROBIC IDENTIFY: CPT

## 2022-09-20 PROCEDURE — 93005 ELECTROCARDIOGRAM TRACING: CPT

## 2022-09-20 PROCEDURE — 36415 COLL VENOUS BLD VENIPUNCTURE: CPT | Performed by: PHYSICIAN ASSISTANT

## 2022-09-20 PROCEDURE — 85018 HEMOGLOBIN: CPT

## 2022-09-20 PROCEDURE — 83605 ASSAY OF LACTIC ACID: CPT | Performed by: PHYSICIAN ASSISTANT

## 2022-09-20 PROCEDURE — C9113 INJ PANTOPRAZOLE SODIUM, VIA: HCPCS

## 2022-09-20 PROCEDURE — 87086 URINE CULTURE/COLONY COUNT: CPT

## 2022-09-20 PROCEDURE — 85730 THROMBOPLASTIN TIME PARTIAL: CPT | Performed by: PHYSICIAN ASSISTANT

## 2022-09-20 PROCEDURE — 99285 EMERGENCY DEPT VISIT HI MDM: CPT | Performed by: PHYSICIAN ASSISTANT

## 2022-09-20 PROCEDURE — 85025 COMPLETE CBC W/AUTO DIFF WBC: CPT | Performed by: PHYSICIAN ASSISTANT

## 2022-09-20 PROCEDURE — 87186 SC STD MICRODIL/AGAR DIL: CPT

## 2022-09-20 PROCEDURE — 81001 URINALYSIS AUTO W/SCOPE: CPT

## 2022-09-20 PROCEDURE — 99220 PR INITIAL OBSERVATION CARE/DAY 70 MINUTES: CPT

## 2022-09-20 PROCEDURE — 85610 PROTHROMBIN TIME: CPT | Performed by: PHYSICIAN ASSISTANT

## 2022-09-20 PROCEDURE — 96360 HYDRATION IV INFUSION INIT: CPT

## 2022-09-20 RX ORDER — FOLIC ACID 1 MG/1
1 TABLET ORAL DAILY
Status: DISCONTINUED | OUTPATIENT
Start: 2022-09-21 | End: 2022-09-23 | Stop reason: HOSPADM

## 2022-09-20 RX ORDER — LEVOTHYROXINE SODIUM 0.1 MG/1
100 TABLET ORAL
Status: DISCONTINUED | OUTPATIENT
Start: 2022-09-21 | End: 2022-09-23 | Stop reason: HOSPADM

## 2022-09-20 RX ORDER — PANTOPRAZOLE SODIUM 40 MG/10ML
40 INJECTION, POWDER, LYOPHILIZED, FOR SOLUTION INTRAVENOUS ONCE
Status: COMPLETED | OUTPATIENT
Start: 2022-09-20 | End: 2022-09-20

## 2022-09-20 RX ORDER — PANTOPRAZOLE SODIUM 40 MG/10ML
40 INJECTION, POWDER, LYOPHILIZED, FOR SOLUTION INTRAVENOUS EVERY 12 HOURS SCHEDULED
Status: DISCONTINUED | OUTPATIENT
Start: 2022-09-21 | End: 2022-09-23 | Stop reason: HOSPADM

## 2022-09-20 RX ORDER — SODIUM CHLORIDE, SODIUM LACTATE, POTASSIUM CHLORIDE, CALCIUM CHLORIDE 600; 310; 30; 20 MG/100ML; MG/100ML; MG/100ML; MG/100ML
100 INJECTION, SOLUTION INTRAVENOUS CONTINUOUS
Status: DISCONTINUED | OUTPATIENT
Start: 2022-09-20 | End: 2022-09-21

## 2022-09-20 RX ORDER — OXYBUTYNIN CHLORIDE 5 MG/1
5 TABLET, EXTENDED RELEASE ORAL DAILY
Status: DISCONTINUED | OUTPATIENT
Start: 2022-09-21 | End: 2022-09-23 | Stop reason: HOSPADM

## 2022-09-20 RX ORDER — PRAVASTATIN SODIUM 20 MG
20 TABLET ORAL
Status: DISCONTINUED | OUTPATIENT
Start: 2022-09-21 | End: 2022-09-23 | Stop reason: HOSPADM

## 2022-09-20 RX ORDER — DULOXETIN HYDROCHLORIDE 20 MG/1
20 CAPSULE, DELAYED RELEASE ORAL DAILY
Status: DISCONTINUED | OUTPATIENT
Start: 2022-09-21 | End: 2022-09-23 | Stop reason: HOSPADM

## 2022-09-20 RX ORDER — PREDNISONE 20 MG/1
60 TABLET ORAL DAILY
Status: DISCONTINUED | OUTPATIENT
Start: 2022-09-21 | End: 2022-09-23 | Stop reason: HOSPADM

## 2022-09-20 RX ORDER — FERROUS SULFATE 325(65) MG
325 TABLET ORAL
Status: DISCONTINUED | OUTPATIENT
Start: 2022-09-21 | End: 2022-09-23 | Stop reason: HOSPADM

## 2022-09-20 RX ORDER — MELOXICAM 7.5 MG/1
15 TABLET ORAL DAILY
Status: DISCONTINUED | OUTPATIENT
Start: 2022-09-21 | End: 2022-09-21

## 2022-09-20 RX ORDER — GABAPENTIN 300 MG/1
300 CAPSULE ORAL 3 TIMES DAILY
Status: DISCONTINUED | OUTPATIENT
Start: 2022-09-20 | End: 2022-09-23 | Stop reason: HOSPADM

## 2022-09-20 RX ADMIN — SODIUM CHLORIDE, SODIUM LACTATE, POTASSIUM CHLORIDE, AND CALCIUM CHLORIDE 100 ML/HR: .6; .31; .03; .02 INJECTION, SOLUTION INTRAVENOUS at 23:13

## 2022-09-20 RX ADMIN — PANTOPRAZOLE SODIUM 40 MG: 40 INJECTION, POWDER, FOR SOLUTION INTRAVENOUS at 23:06

## 2022-09-20 RX ADMIN — SODIUM CHLORIDE 1000 ML: 0.9 INJECTION, SOLUTION INTRAVENOUS at 19:17

## 2022-09-20 NOTE — ED PROVIDER NOTES
History  Chief Complaint   Patient presents with    Hypotension     Per EMS, patient coming from [facility] with c/o hypotension on 80/40  Patient c/o lightheadness since [address]       29-year-old female patient here for evaluation of hypotension  She began complaining of lightheadedness around 5:30 p m  Today, afterwards staff checked blood pressure was found to be 80 systolic  EMS was called  EN route she had blood pressures ranging from 18-47 systolic  Currently 26C systolic  She got about 150 mL of saline prior to arrival   Currently only complains of lightheadedness  She is alert and oriented to self and location  She specifically denies fevers chills nausea vomiting chest pain or shortness of breath  No abdominal pain  She does have history of hypertension  She is on quinapril and labetalol  Prior to Admission Medications   Prescriptions Last Dose Informant Patient Reported? Taking?    Calcium Carbonate-Vitamin D 600-400 MG-UNIT per tablet  Care Giver Yes No   Si tablets   DULoxetine (CYMBALTA) 20 mg capsule   No No   Sig: Take 1 capsule (20 mg total) by mouth daily   Diclofenac Sodium (VOLTAREN) 1 %   Yes No   Sig: As needed   FeroSul 325 (65 Fe) MG tablet   Yes No   Myrbetriq 25 MG TB24  Care Giver Yes No   Si mg   acetaminophen (TYLENOL) 325 mg tablet   No No   Sig: Take 2 tablets (650 mg total) by mouth every 6 (six) hours as needed for mild pain or moderate pain   alendronate (FOSAMAX) 70 mg tablet   Yes No   Sig: Take 70 mg by mouth every 7 days Every Tuesday   amLODIPine (NORVASC) 10 mg tablet  Care Giver Yes No   Sig: 10 mg   clopidogrel (PLAVIX) 75 mg tablet   Yes No   Sig: Take 75 mg by mouth daily   folic acid (FOLVITE) 1 mg tablet  Care Giver Yes No   Si mg   gabapentin (NEURONTIN) 300 mg capsule   No No   Sig: Take 1 capsule (300 mg total) by mouth 3 (three) times a day   labetalol (NORMODYNE) 100 mg tablet   No No   Sig: Take 0 5 tablets (50 mg total) by mouth in the morning   levothyroxine 100 mcg tablet   No No   Sig: Take 1 tablet (100 mcg total) by mouth daily in the early morning   meloxicam (MOBIC) 15 mg tablet  Care Giver No No   Sig: Take 1 tablet (15 mg total) by mouth daily   meloxicam (MOBIC) 7 5 mg tablet  Care Giver Yes No   Si 5 mg   pantoprazole (PROTONIX) 20 mg tablet   No No   Sig: Take 1 tablet (20 mg total) by mouth daily in the early morning   pravastatin (PRAVACHOL) 20 mg tablet   Yes No   Sig: pravastatin 20 mg tablet   predniSONE 20 mg tablet   No No   Sig: Take 3 tablets (60 mg total) by mouth daily   quinapril (ACCUPRIL) 40 MG tablet  Care Giver Yes No   Si mg   risedronate (ACTONEL) 35 mg tablet  Care Giver Yes No   Sig: Take 35 mg by mouth every 7 days Every Friday   senna (SENOKOT) 8 6 mg   No No   Sig: Take 1 tablet (8 6 mg total) by mouth daily at bedtime as needed for constipation   traMADol (ULTRAM) 50 mg tablet   No No   Sig: Take 1 tablet (50 mg total) by mouth every 8 (eight) hours as needed for moderate pain      Facility-Administered Medications: None       Past Medical History:   Diagnosis Date    Hemip[address] )     Hyperlipidemia     Hypothyroidism     Kidney disorder     Osteoarthritis     [facility]     Vascular disease        Past Surgical History:   Procedure Laterality Date    CATARACT EXTRACTION      FRACTURE SURGERY      AL TEMPORAL ARTERY LIGATN OR BX Bilateral 6/3/2022    Procedure: BIOPSY ARTERY TEMPORAL;  Surgeon: Gina Lin MD;  Location: [facility] OR;  Service: Vascular       Family History   Problem Relation Age of Onset    No Known Problems Mother     No Known Problems Father      I have reviewed and agree with the history as documented      E-Cigarette/Vaping    E-Cigarette Use Never User      E-Cigarette/Vaping Substances    Nicotine No     THC No     CBD No     Flavoring No     Other No     Unknown No      Social History     Tobacco Use    Smoking status: Never Smoker  Smokeless tobacco: Never Used   Vaping Use    Vaping Use: Never used   Substance Use Topics    Alcohol use: Never       Review of Systems   Constitutional: Negative for chills and fever  HENT: Negative for ear pain and sore throat  Eyes: Negative for pain and visual disturbance  Respiratory: Negative for cough and shortness of breath  Cardiovascular: Negative for chest pain and palpitations  Gastrointestinal: Negative for abdominal pain and vomiting  Genitourinary: Negative for dysuria and hematuria  Musculoskeletal: Negative for arthralgias and back pain  Skin: Negative for color change and rash  Neurological: Positive for light-headedness  Negative for seizures, syncope and headaches  All other systems reviewed and are negative  Physical Exam  Physical Exam  Vitals and nursing note reviewed  Constitutional:       General: She is not in acute distress  Appearance: She is well-developed  HENT:      Head: Normocephalic and atraumatic  Eyes:      Conjunctiva/sclera: Conjunctivae normal    Cardiovascular:      Rate and Rhythm: Normal rate and regular rhythm  Heart sounds: No murmur heard  Pulmonary:      Effort: Pulmonary effort is normal  No respiratory distress  Breath sounds: Normal breath sounds  Abdominal:      Palpations: Abdomen is soft  Tenderness: There is no abdominal tenderness  Musculoskeletal:      Cervical back: Neck supple  Skin:     General: Skin is warm and dry  Neurological:      Mental Status: She is alert  Comments: GCS 15  AAOx2  CN II-XII grossly intact  No focal neuro deficits             Vital Signs  ED Triage Vitals [09/20/22 1855]   Temperature Pulse Respirations Blood Pressure SpO2   98 4 °F (36 9 °C) 79 18 (!) 93/49 97 %      Temp Source Heart Rate Source Patient Position - Orthostatic VS BP Location FiO2 (%)   Oral Monitor Lying Right arm --      Pain Score       No Pain           Vitals:    09/20/22 2100 09/20/22 2115 09/20/22 2130 09/20/22 2145   BP: 104/53 120/58 102/50 109/52   Pulse: 76 76 74 75   Patient Position - Orthostatic VS: Lying Lying Lying Lying         Visual Acuity      ED Medications  Medications   pantoprazole (PROTONIX) injection 40 mg (has no administration in time range)   sodium chloride 0 9 % bolus 1,000 mL (0 mL Intravenous Stopped 9/20/22 2040)       Diagnostic Studies  Results Reviewed     Procedure Component Value Units Date/Time    HS Troponin I 2hr [[id number]]     Lab Status: No result Specimen: Blood     HS Troponin I 4hr [[id number]]     Lab Status: No result Specimen: Blood     HS Troponin 0hr (reflex protocol) [[id number]]  (Normal) Collected: 09/20/22 1920    Lab Status: Final result Specimen: Blood from Arm, Left Updated: 09/20/22 2031     hs TnI 0hr 28 ng/L     FLU/RSV/COVID - if FLU/RSV clinically relevant [[id number]]  (Normal) Collected: 09/20/22 1920    Lab Status: Final result Specimen: Nares from Nose Updated: 09/20/22 2023     SARS-CoV-2 Negative     INFLUENZA A PCR Negative     INFLUENZA B PCR Negative     RSV PCR Negative    Narrative:      FOR PEDIATRIC PATIENTS - copy/paste COVID Guidelines URL to browser: [email or web address] [email or web address]  [employer]    SARS-CoV-2 assay is a Nucleic Acid Amplification assay intended for the  qualitative detection of nucleic acid from SARS-CoV-2 in nasopharyngeal  swabs  Results are for the presumptive identification of SARS-CoV-2 RNA  Positive results are indicative of infection with SARS-CoV-2, the virus  causing COVID-19, but do not rule out bacterial infection or co-infection  with other viruses  Laboratories within the [place] and its  territories are required to report all positive results to the appropriate  public health authorities  Negative results do not preclude SARS-CoV-2  infection and should not be used as the sole basis for treatment or other  patient management decisions   Negative results must be combined with  clinical observations, patient history, and epidemiological information  This test has not been FDA cleared or approved  This test has been authorized by FDA under an Emergency Use Authorization  (EUA)  This test is only authorized for the duration of time the  declaration that circumstances exist justifying the authorization of the  emergency use of an in vitro diagnostic tests for detection of SARS-CoV-2  virus and/or diagnosis of COVID-19 infection under section 564(b)(1) of  the Act, 21 U  S C  363IHJ-3(D)(1), unless the authorization is terminated  or revoked sooner  The test has been validated but independent review by FDA  and CLIA is pending  Test performed using [employer] GeneXpert: This RT-PCR assay targets N2,  a region unique to SARS-CoV-2  A conserved region in the E-gene was chosen  for pan-Sarbecovirus detection which includes SARS-CoV-2  According to CMS-2020-01-R, this platform meets the definition of high-[employer] technology  Lactic acid [[id number]]  (Normal) Collected: 09/20/22 1920    Lab Status: Final result Specimen: Blood from Arm, Left Updated: 09/20/22 1952     LACTIC ACID 1 1 mmol/L     Narrative:      Result may be elevated if tourniquet was used during collection      CBC and differential [[id number]]  (Abnormal) Collected: 09/20/22 1920    Lab Status: Final result Specimen: Blood from Arm, Left Updated: 09/20/22 1951     WBC 7 85 Thousand/uL      RBC 3 11 Million/uL      Hemoglobin 9 2 g/dL      Hematocrit 29 7 %      MCV 96 fL      MCH 29 6 pg      MCHC 31 0 g/dL      RDW 17 1 %      MPV 11 1 fL      Platelets 353 Thousands/uL      nRBC 2 /100 WBCs      Neutrophils Relative 79 %      Immat GRANS % 3 %      Lymphocytes Relative 13 %      Monocytes Relative 5 %      Eosinophils Relative 0 %      Basophils Relative 0 %      Neutrophils Absolute 6 14 Thousands/µL      Immature Grans Absolute 0 23 Thousand/uL      Lymphocytes Absolute 1 05 Thousands/µL      Monocytes Absolute 0 41 Thousand/µL      Eosinophils Absolute 0 01 Thousand/µL      Basophils Absolute 0 01 Thousands/µL     Narrative: This is an appended report  These results have been appended to a previously verified report  Protime-INR [[id number]]  (Normal) Collected: 09/20/22 1920    Lab Status: Final result Specimen: Blood from Arm, Left Updated: 09/20/22 1945     Protime 13 7 seconds      INR 1 07    APTT [[id number]]  (Normal) Collected: 09/20/22 1920    Lab Status: Final result Specimen: Blood from Arm, Left Updated: 09/20/22 1945     PTT 29 seconds     UA w Reflex to Microscopic w Reflex to Culture [[id number]]     Lab Status: No result Specimen: Urine                  XR chest 2 views    (Results Pending)              Procedures  ECG 12 Lead Documentation Only    Date/Time: 9/20/2022 7:05 PM  Performed by: Blanca Coelho PA-C  Authorized by: Blanca Coelho PA-C     ECG reviewed by me, the ED Provider: yes    Patient location:  ED  Interpretation:     Interpretation: normal    Quality:     Tracing quality:  Limited by artifact  Rate:     ECG rate:  77    ECG rate assessment: normal    Rhythm:     Rhythm: sinus rhythm    Ectopy:     Ectopy: PAC    QRS:     QRS axis:  Normal    QRS intervals:  Normal  Conduction:     Conduction: abnormal      Abnormal conduction: complete RBBB    ST segments:     ST segments:  Normal  T waves:     T waves: normal               ED Course             HEART Risk Score    Flowsheet Row Most Recent Value   Heart Score Risk Calculator    History 0 Filed at: 09/20/2022 2201   ECG 1 Filed at: 09/20/2022 2201   Age 2 Filed at: 09/20/2022 2201   Risk Factors 2 Filed at: 09/20/2022 2201   Troponin 1 Filed at: 09/20/2022 2201   HEART Score 6 Filed at: 09/20/2022 2201                        SBIRT 20yo+    Flowsheet Row Most Recent Value   SBIRT (23 yo +)    In order to provide better care to our patients, we are screening all of our patients for alcohol and drug use   Would it be okay to ask you these screening questions? Yes Filed at: 09/20/2022 1900   Initial Alcohol Screen: US AUDIT-C     1  How often do you have a drink containing alcohol? 1 Filed at: 09/20/2022 1900   2  How many drinks containing alcohol do you have on a typical day you are drinking? 1 Filed at: 09/20/2022 1900   3b  FEMALE Any Age, or MALE 65+: How often do you have 4 or more drinks on one occassion? 0 Filed at: 09/20/2022 1900   Audit-C Score 2 Filed at: 09/20/2022 1900   BENJY: How many times in the past year have you    Used an illegal drug or used a prescription medication for non-medical reasons? Never Filed at: 09/20/2022 1900                    MDM  Number of Diagnoses or Management Options  Anemia: new and requires workup  Hypotension: new and requires workup  Diagnosis management comments: Differential diagnosis includes is not limited to cardiac etiology including ACS, AMI, infectious etiology including UTI pneumonia in shock  Dehydration  Adverse effects of medication  Plan:  Cardiac workup  Continue fluids for now  Prevent blood pressure recheck  Dispo pending  Amount and/or Complexity of Data Reviewed  Clinical lab tests: ordered and reviewed  Tests in the radiology section of CPT®: ordered and reviewed    Risk of Complications, Morbidity, and/or Mortality  Presenting problems: moderate  Management options: low  General comments: 80-year-old female here for hypotension  Blood pressure improved with fluids  Has remained on the soft side with systolic pressures ranging 90s to 100s  Her labs revealed an anemia which appears to be acute on chronic  She seems to be slowly drifting down in the past 1-2 months  Because of the drop in hemoglobin rectal exam was checked and she did have heme-positive stool with melena as the sample  Remainder of her workup was overall unremarkable  Etiology for hypotension is unclear    Could be medication related however in the setting of heme-positive stool and slow decrease in hemoglobin, will admit for serial hemoglobins  May need GI evaluation to rule out ongoing GI bleed  Stable at the time of admission  Patient Progress  Patient progress: stable      Disposition  Final diagnoses:   Hypotension   Anemia     Time reflects when diagnosis was documented in both MDM as applicable and the Disposition within this note     Time User Action Codes Description Comment    9/20/2022  9:55 PM Aide Come Add [I95 9] Hypotension     9/20/2022  9:55 PM Aide Come Add [D64 9] Anemia       ED Disposition     ED Disposition   Admit    Condition   Stable    Date/Time   Tue Sep 20, 2022  9:55 PM    Comment   Case was discussed with Jaison Yarbrough and the patient's admission status was agreed to be Admission Status: observation status to the service of Dr Nicki Sprague   Follow-up Information    None         Patient's Medications   Discharge Prescriptions    No medications on file       No discharge procedures on file      PDMP Review     None          ED Provider  Electronically Signed by           Diego Busch PA-C  09/20/22 7372       Diego Busch PA-C  09/20/22 5201

## 2022-09-21 PROBLEM — N39.0 URINARY TRACT INFECTION: Status: ACTIVE | Noted: 2022-09-21

## 2022-09-21 PROBLEM — E16.2 HYPOGLYCEMIA: Status: ACTIVE | Noted: 2022-09-21

## 2022-09-21 PROBLEM — R41.82 ALTERED MENTAL STATUS: Status: ACTIVE | Noted: 2022-09-21

## 2022-09-21 LAB
ANION GAP SERPL CALCULATED.3IONS-SCNC: 7 MMOL/L (ref 4–13)
ANION GAP SERPL CALCULATED.3IONS-SCNC: 8 MMOL/L (ref 4–13)
ATRIAL RATE: 72 BPM
ATRIAL RATE: 77 BPM
BUN SERPL-MCNC: 36 MG/DL (ref 5–25)
BUN SERPL-MCNC: 39 MG/DL (ref 5–25)
CALCIUM SERPL-MCNC: 8.1 MG/DL (ref 8.3–10.1)
CALCIUM SERPL-MCNC: 8.7 MG/DL (ref 8.3–10.1)
CHLORIDE SERPL-SCNC: 104 MMOL/L (ref 96–108)
CHLORIDE SERPL-SCNC: 106 MMOL/L (ref 96–108)
CO2 SERPL-SCNC: 28 MMOL/L (ref 21–32)
CO2 SERPL-SCNC: 29 MMOL/L (ref 21–32)
CREAT SERPL-MCNC: 0.74 MG/DL (ref 0.6–1.3)
CREAT SERPL-MCNC: 0.77 MG/DL (ref 0.6–1.3)
GFR SERPL CREATININE-BSD FRML MDRD: 68 ML/MIN/1.73SQ M
GFR SERPL CREATININE-BSD FRML MDRD: 72 ML/MIN/1.73SQ M
GLUCOSE P FAST SERPL-MCNC: 81 MG/DL (ref 65–99)
GLUCOSE SERPL-MCNC: 107 MG/DL (ref 65–140)
GLUCOSE SERPL-MCNC: 112 MG/DL (ref 65–140)
GLUCOSE SERPL-MCNC: 120 MG/DL (ref 65–140)
GLUCOSE SERPL-MCNC: 122 MG/DL (ref 65–140)
GLUCOSE SERPL-MCNC: 32 MG/DL (ref 65–140)
GLUCOSE SERPL-MCNC: 35 MG/DL (ref 65–140)
GLUCOSE SERPL-MCNC: 75 MG/DL (ref 65–140)
GLUCOSE SERPL-MCNC: 81 MG/DL (ref 65–140)
GLUCOSE SERPL-MCNC: 83 MG/DL (ref 65–140)
GLUCOSE SERPL-MCNC: 90 MG/DL (ref 65–140)
GLUCOSE SERPL-MCNC: 96 MG/DL (ref 65–140)
GLUCOSE SERPL-MCNC: 97 MG/DL (ref 65–140)
HCT VFR BLD AUTO: 33.3 % (ref 34.8–46.1)
HCT VFR BLD AUTO: 35.6 % (ref 34.8–46.1)
HGB BLD-MCNC: 10.2 G/DL (ref 11.5–15.4)
HGB BLD-MCNC: 10.7 G/DL (ref 11.5–15.4)
P AXIS: 65 DEGREES
P AXIS: 69 DEGREES
POTASSIUM SERPL-SCNC: 3.6 MMOL/L (ref 3.5–5.3)
POTASSIUM SERPL-SCNC: 4.2 MMOL/L (ref 3.5–5.3)
PR INTERVAL: 150 MS
PR INTERVAL: 156 MS
QRS AXIS: -1 DEGREES
QRS AXIS: -7 DEGREES
QRSD INTERVAL: 142 MS
QRSD INTERVAL: 146 MS
QT INTERVAL: 450 MS
QT INTERVAL: 474 MS
QTC INTERVAL: 509 MS
QTC INTERVAL: 519 MS
SODIUM SERPL-SCNC: 140 MMOL/L (ref 135–147)
SODIUM SERPL-SCNC: 142 MMOL/L (ref 135–147)
T WAVE AXIS: 48 DEGREES
T WAVE AXIS: 49 DEGREES
VENTRICULAR RATE: 72 BPM
VENTRICULAR RATE: 77 BPM

## 2022-09-21 PROCEDURE — C9113 INJ PANTOPRAZOLE SODIUM, VIA: HCPCS

## 2022-09-21 PROCEDURE — 85014 HEMATOCRIT: CPT

## 2022-09-21 PROCEDURE — 99222 1ST HOSP IP/OBS MODERATE 55: CPT | Performed by: INTERNAL MEDICINE

## 2022-09-21 PROCEDURE — 36415 COLL VENOUS BLD VENIPUNCTURE: CPT

## 2022-09-21 PROCEDURE — 82948 REAGENT STRIP/BLOOD GLUCOSE: CPT

## 2022-09-21 PROCEDURE — NC001 PR NO CHARGE: Performed by: PHYSICIAN ASSISTANT

## 2022-09-21 PROCEDURE — 99232 SBSQ HOSP IP/OBS MODERATE 35: CPT | Performed by: STUDENT IN AN ORGANIZED HEALTH CARE EDUCATION/TRAINING PROGRAM

## 2022-09-21 PROCEDURE — 85018 HEMOGLOBIN: CPT

## 2022-09-21 PROCEDURE — 93010 ELECTROCARDIOGRAM REPORT: CPT | Performed by: INTERNAL MEDICINE

## 2022-09-21 PROCEDURE — NC001 PR NO CHARGE: Performed by: INTERNAL MEDICINE

## 2022-09-21 PROCEDURE — 80048 BASIC METABOLIC PNL TOTAL CA: CPT

## 2022-09-21 RX ORDER — DEXTROSE MONOHYDRATE 25 G/50ML
INJECTION, SOLUTION INTRAVENOUS
Status: COMPLETED
Start: 2022-09-21 | End: 2022-09-21

## 2022-09-21 RX ORDER — DEXTROSE MONOHYDRATE 25 G/50ML
50 INJECTION, SOLUTION INTRAVENOUS ONCE
Status: COMPLETED | OUTPATIENT
Start: 2022-09-21 | End: 2022-09-21

## 2022-09-21 RX ORDER — ENOXAPARIN SODIUM 100 MG/ML
40 INJECTION SUBCUTANEOUS
Status: DISCONTINUED | OUTPATIENT
Start: 2022-09-21 | End: 2022-09-23 | Stop reason: HOSPADM

## 2022-09-21 RX ORDER — DEXTROSE AND SODIUM CHLORIDE 5; .9 G/100ML; G/100ML
100 INJECTION, SOLUTION INTRAVENOUS CONTINUOUS
Status: DISCONTINUED | OUTPATIENT
Start: 2022-09-21 | End: 2022-09-22

## 2022-09-21 RX ORDER — SODIUM CHLORIDE 9 MG/ML
100 INJECTION, SOLUTION INTRAVENOUS CONTINUOUS
Status: DISCONTINUED | OUTPATIENT
Start: 2022-09-21 | End: 2022-09-22

## 2022-09-21 RX ADMIN — GABAPENTIN 300 MG: 300 CAPSULE ORAL at 16:44

## 2022-09-21 RX ADMIN — CEFTRIAXONE SODIUM 1000 MG: 10 INJECTION, POWDER, FOR SOLUTION INTRAVENOUS at 10:45

## 2022-09-21 RX ADMIN — ENOXAPARIN SODIUM 40 MG: 40 INJECTION SUBCUTANEOUS at 12:39

## 2022-09-21 RX ADMIN — PANTOPRAZOLE SODIUM 40 MG: 40 INJECTION, POWDER, FOR SOLUTION INTRAVENOUS at 08:19

## 2022-09-21 RX ADMIN — MELOXICAM 15 MG: 7.5 TABLET ORAL at 08:22

## 2022-09-21 RX ADMIN — DEXTROSE MONOHYDRATE 50 ML: 25 INJECTION, SOLUTION INTRAVENOUS at 00:35

## 2022-09-21 RX ADMIN — LEVOTHYROXINE SODIUM 100 MCG: 100 TABLET ORAL at 05:48

## 2022-09-21 RX ADMIN — DULOXETINE 20 MG: 20 CAPSULE, DELAYED RELEASE ORAL at 08:20

## 2022-09-21 RX ADMIN — DEXTROSE AND SODIUM CHLORIDE 100 ML/HR: 5; .9 INJECTION, SOLUTION INTRAVENOUS at 03:37

## 2022-09-21 RX ADMIN — PANTOPRAZOLE SODIUM 40 MG: 40 INJECTION, POWDER, FOR SOLUTION INTRAVENOUS at 21:57

## 2022-09-21 RX ADMIN — SODIUM CHLORIDE 100 ML/HR: 0.9 INJECTION, SOLUTION INTRAVENOUS at 21:57

## 2022-09-21 RX ADMIN — SODIUM CHLORIDE 100 ML/HR: 0.9 INJECTION, SOLUTION INTRAVENOUS at 10:58

## 2022-09-21 RX ADMIN — OXYBUTYNIN 5 MG: 5 TABLET, FILM COATED, EXTENDED RELEASE ORAL at 08:21

## 2022-09-21 RX ADMIN — FOLIC ACID 1 MG: 1 TABLET ORAL at 08:21

## 2022-09-21 RX ADMIN — FERROUS SULFATE TAB 325 MG (65 MG ELEMENTAL FE) 325 MG: 325 (65 FE) TAB at 08:19

## 2022-09-21 RX ADMIN — GABAPENTIN 300 MG: 300 CAPSULE ORAL at 08:20

## 2022-09-21 RX ADMIN — PREDNISONE 60 MG: 20 TABLET ORAL at 08:20

## 2022-09-21 RX ADMIN — PRAVASTATIN SODIUM 20 MG: 20 TABLET ORAL at 16:44

## 2022-09-21 NOTE — PLAN OF CARE
Problem: Potential for Falls  Goal: Patient will remain free of falls  Description: INTERVENTIONS:  - Educate patient/family on patient safety including physical limitations  - Instruct patient to call for assistance with activity   - Consult OT/PT to assist with strengthening/mobility   - Keep Call bell within reach  - Keep bed low and locked with side rails adjusted as appropriate  - Keep care items and personal belongings within reach  - Initiate and maintain comfort rounds  - Make Fall Risk Sign visible to staff  - Offer Toileting every  Hours, in advance of need  - Initiate/Maintain alarm  - Obtain necessary fall risk management equipment:   - Apply yellow socks and bracelet for high fall risk patients  - Consider moving patient to room near nurses station  Outcome: Progressing     Problem: MOBILITY - ADULT  Goal: Maintain or return to baseline ADL function  Description: INTERVENTIONS:  -  Assess patient's ability to carry out ADLs; assess patient's baseline for ADL function and identify physical deficits which impact ability to perform ADLs (bathing, care of mouth/teeth, toileting, grooming, dressing, etc )  - Assess/evaluate cause of self-care deficits   - Assess range of motion  - Assess patient's mobility; develop plan if impaired  - Assess patient's need for assistive devices and provide as appropriate  - Encourage maximum independence but intervene and supervise when necessary  - Involve family in performance of ADLs  - Assess for home care needs following discharge   - Consider OT consult to assist with ADL evaluation and planning for discharge  - Provide patient education as appropriate  Outcome: Progressing  Goal: Maintains/Returns to pre admission functional level  Description: INTERVENTIONS:  - Perform BMAT or MOVE assessment daily    - Set and communicate daily mobility goal to care team and patient/family/caregiver     - Collaborate with rehabilitation services on mobility goals if consulted  - Perform Range of Motion  times a day  - Reposition patient every  hours    - Dangle patient  times a day  - Stand patient  times a day  - Ambulate patient  times a day  - Out of bed to chair  times a day   - Out of bed for meal times a day  - Out of bed for toileting  - Record patient progress and toleration of activity level   Outcome: Progressing

## 2022-09-21 NOTE — QUICK NOTE
Received critical lab result, BG 32, confirmed <35 on POCT  D50 bolus administered  POCT recheck 120  Maintain close BG monitoring overnight  Addendum: Repeat BG 90, will add D5 to patient's IVF infusion and maintain frequent BG checks

## 2022-09-21 NOTE — ASSESSMENT & PLAN NOTE
UA positive nitrites and leukocytes  No suprapubic tenderness or CVA on exam  Afebrile  WBC 7 85 and lactic acid 1 1 normal  Unclear if patient is symptomatic or not; unable to answer questions  Will treat appropriately with abx     · Ceftriaxone 1000mg started today (9/21)  · Urine cultures pending  · Monitor temperature

## 2022-09-21 NOTE — H&P
[address] [place] [address]  H&P- Lisa Pineda 1933, 80 y o  female MRN: [id number]  Unit/Bed#: ED 07 Encounter: [id number]  Primary Care Provider: Milagro Richmond DO   Date and time admitted to hospital: 9/20/2022  6:51 PM    * Hypotension  Assessment & Plan  Patient reports development of lightheadedness at her care facility earlier today  Her SBP was found to be 70-90 at the facility and she was sent to the emergency department  Patient is without symptom/complaint at this time, although unsure of accuracy of ROS w/ possible demenita/schizophrenia baseline status      /52 (BP Location: Right arm)   Pulse 75   Temp 98 4 °F (36 9 °C) (Oral)   Resp 15   Wt 84 7 kg (186 lb 11 7 oz)   SpO2 98%   BMI 29 25 kg/m²     · SBP reportedly 70-90's at outpatient care facility w/ patient complaining of lightheadedness  · Was 93/49 on ED presentation  · Currently 122/56 after 1L IVF bolus  · Possible slow GI blood loss with anemia as below  · Patient without complaint at this time; however has questionable demenia/schizophrenia baseline mental status  · Hold home HTN medications  · Continuous gentle IVF hydration overnight  · Closely monitor BP    Anemia  Assessment & Plan  · HGB 9 2 today; was 12 4 in May, 10 7 in July  · Per ED staff patient had melanotic stool on rectal exam which was heme positive   · Possible slow GI loss; however patient is taking supplemental iron  · Hold home plavix and VTE prophylaxis overnight  · q8H HGB ordered   · Will make NPO after midnight   · GI consulted     Hemiplegia ([facility] [address] )  Assessment & Plan  · Left-sided  · 2/2 prior CVA  · Continue home daily plavix, statin, and gabapentin     Schizophrenia ([facility] [address] )  Assessment & Plan  · Denies SI/HI or hallucination  · Not currently on outpatient antipsychotic   · Continue outpatient f/u    Hypothyroidism  Assessment & Plan  · Continue home levothyroxine     Hyperlipidemia  Assessment & Plan  · Continue home statin    VTE Pharmacologic Prophylaxis: VTE Score: 4 Moderate Risk (Score 3-4) - Pharmacological DVT Prophylaxis Contraindicated  Sequential Compression Devices Ordered  Code Status: Level 1 - Full Code   Discussion with family: update in AM      Anticipated Length of Stay: Patient will be admitted on an observation basis with an anticipated length of stay of less than 2 midnights secondary to hypotension  Total Time for Visit, including Counseling / Coordination of Care: 45 minutes Greater than 50% of this total time spent on direct patient counseling and coordination of care  Chief Complaint: lightheadedness    History of Present Illness:  Ya Unger is a 80 y o  female with a PMH of Prior CVA w/resulting hemiparesis, hypothyroidism, schizophrenia, and HTN who presents with lightheadedness and hypotension  Patient reports development of lightheadedness at her care facility earlier today  Her SBP was found to be 70-90 at the facility and she was sent to the emergency department  Patient is without symptom/complaint at this time, although unsure of accuracy of ROS w/ possible demenita/schizophrenia baseline status  All questions answered at the bedside to the best of my ability  Review of Systems:  Review of Systems   Unable to perform ROS: Dementia       Past Medical and Surgical History:   Past Medical History:   Diagnosis Date    Hemiplegia ([facility] [address] [facility]     Hyperlipidemia     Hypothyroidism     Kidney disorder     Osteoarthritis     Schizophrenia ([facility] [address] )     Vascular disease        Past Surgical History:   Procedure Laterality Date    CATARACT EXTRACTION      FRACTURE SURGERY      FL TEMPORAL ARTERY LIGATN OR BX Bilateral 6/3/2022    Procedure: BIOPSY ARTERY TEMPORAL;  Surgeon: Lazaro Valdez MD;  Location: [facility];  Service: Vascular       Meds/Allergies:  Prior to Admission medications    Medication Sig Start Date End Date Taking?  Authorizing Provider   acetaminophen (TYLENOL) 325 mg tablet Take 2 tablets (650 mg total) by mouth every 6 (six) hours as needed for mild pain or moderate pain 5/28/22   Yani Ng PA-C   alendronate (FOSAMAX) 70 mg tablet Take 70 mg by mouth every 7 days Every Tuesday    Historical Provider, MD   amLODIPine (NORVASC) 10 mg tablet 10 mg 7/23/20   Historical Provider, MD   Calcium Carbonate-Vitamin D 600-400 MG-UNIT per tablet 600 tablets 7/23/20   Historical Provider, MD   clopidogrel (PLAVIX) 75 mg tablet Take 75 mg by mouth daily 1/5/22   Historical Provider, MD   Diclofenac Sodium (VOLTAREN) 1 % As needed    Historical Provider, MD   DULoxetine (CYMBALTA) 20 mg capsule Take 1 capsule (20 mg total) by mouth daily 5/29/22 6/28/22  ROSA Oconnell   FeroSul 325 (65 Fe) MG tablet  12/1/21   Historical Provider, MD   folic acid (FOLVITE) 1 mg tablet 1 mg 7/23/20   Historical Provider, MD   gabapentin (NEURONTIN) 300 mg capsule Take 1 capsule (300 mg total) by mouth 3 (three) times a day 5/28/22   Yani Ng PA-C   labetalol (NORMODYNE) 100 mg tablet Take 0 5 tablets (50 mg total) by mouth in the morning 5/28/22   Yani Ng PA-C   levothyroxine 100 mcg tablet Take 1 tablet (100 mcg total) by mouth daily in the early morning 5/29/22   Adelina Rivera PA-C   meloxicam (MOBIC) 15 mg tablet Take 1 tablet (15 mg total) by mouth daily 9/23/20   Letty Raymond DO   meloxicam (MOBIC) 7 5 mg tablet 7 5 mg 7/23/20   Historical Provider, MD   Myrbetriq 25 MG TB24 25 mg 7/27/20   Historical Provider, MD   pantoprazole (PROTONIX) 20 mg tablet Take 1 tablet (20 mg total) by mouth daily in the early morning 5/29/22   Yani Ng PA-C   pravastatin (PRAVACHOL) 20 mg tablet pravastatin 20 mg tablet    Historical Provider, MD   predniSONE 20 mg tablet Take 3 tablets (60 mg total) by mouth daily 5/29/22   Yani Ng PA-C   quinapril (ACCUPRIL) 40 MG tablet 40 mg 7/23/20   Historical Provider, MD   risedronate (ACTONEL) 35 mg tablet Take 35 mg by mouth every 7 days Every Friday 7/17/20   Historical Provider, MD   senna (SENOKOT) 8 6 mg Take 1 tablet (8 6 mg total) by mouth daily at bedtime as needed for constipation 5/28/22   Adelina Rivera PA-C   traMADol (ULTRAM) 50 mg tablet Take 1 tablet (50 mg total) by mouth every 8 (eight) hours as needed for moderate pain 5/28/22   Star Brittle, PA-C     I have reviewed home medications using recent Epic encounter  Allergies: No Known Allergies    Social History:  Marital Status:    Occupation: N/A  Patient Pre-hospital Living Situation: Home  Patient Pre-hospital Level of Mobility: non-ambulatory/bed bound  Patient Pre-hospital Diet Restrictions: Heart healthy  Substance Use History:   Social History     Substance and Sexual Activity   Alcohol Use Never     Social History     Tobacco Use   Smoking Status Never Smoker   Smokeless Tobacco Never Used     Social History     Substance and Sexual Activity   Drug Use Not on file       Family History:  Family History   Problem Relation Age of Onset    No Known Problems Mother     No Known Problems Father        Physical Exam:     Vitals:   Blood Pressure: 109/52 (09/20/22 2145)  Pulse: 75 (09/20/22 2145)  Temperature: 98 4 °F (36 9 °C) (09/20/22 1855)  Temp Source: Oral (09/20/22 1855)  Respirations: 15 (09/20/22 2145)  Weight - Scale: 84 7 kg (186 lb 11 7 oz) (09/20/22 1855)  SpO2: 98 % (09/20/22 2145)    Physical Exam  Vitals and nursing note reviewed  Constitutional:       General: She is not in acute distress  Appearance: Normal appearance  HENT:      Head: Normocephalic and atraumatic  Right Ear: External ear normal       Left Ear: External ear normal       Nose: Nose normal       Mouth/Throat:      Mouth: Mucous membranes are moist    Eyes:      Pupils: Pupils are equal, round, and reactive to light  Cardiovascular:      Rate and Rhythm: Normal rate and regular rhythm  Pulses: Normal pulses  Heart sounds: Normal heart sounds  No murmur heard    Pulmonary:      Effort: Pulmonary effort is normal  No respiratory distress  Breath sounds: Normal breath sounds  No wheezing or rales  Chest:      Chest wall: No tenderness  Abdominal:      General: Bowel sounds are normal  There is no distension  Palpations: Abdomen is soft  There is no mass  Tenderness: There is no abdominal tenderness  There is no guarding  Musculoskeletal:         General: No swelling or tenderness  Cervical back: Normal range of motion and neck supple  No rigidity or tenderness  Right lower leg: No edema  Left lower leg: No edema  Skin:     General: Skin is warm and dry  Capillary Refill: Capillary refill takes less than 2 seconds  Findings: No lesion or rash  Neurological:      General: No focal deficit present  Mental Status: She is alert  Motor: Weakness (left-sided) present  Psychiatric:         Mood and Affect: Mood normal           Additional Data:     Lab Results:  Results from last 7 days   Lab Units 09/20/22  1920   WBC Thousand/uL 7 85   HEMOGLOBIN g/dL 9 2*   HEMATOCRIT % 29 7*   PLATELETS Thousands/uL 165   NEUTROS PCT % 79*   LYMPHS PCT % 13*   MONOS PCT % 5   EOS PCT % 0         Results from last 7 days   Lab Units 09/20/22  1920   INR  1 07             Results from last 7 days   Lab Units 09/20/22  1920   LACTIC ACID mmol/L 1 1       Imaging: Personally reviewed the following imaging: chest xray  XR chest 2 views    (Results Pending)       EKG and Other Studies Reviewed on Admission:   · EKG: NSR  HR 77     ** Please Note: This note has been constructed using a voice recognition system   ** Curettage Text: The wound bed was treated with curettage after the biopsy was performed.

## 2022-09-21 NOTE — PLAN OF CARE
Problem: CARDIOVASCULAR - ADULT  Goal: Maintains optimal cardiac output and hemodynamic stability  Description: INTERVENTIONS:  - Monitor I/O, vital signs and rhythm  - Monitor for S/S and trends of decreased cardiac output  - Administer and titrate ordered vasoactive medications to optimize hemodynamic stability  - Assess quality of pulses, skin color and temperature  - Assess for signs of decreased coronary artery perfusion  - Instruct patient to report change in severity of symptoms  Outcome: Progressing  Goal: Absence of cardiac dysrhythmias or at baseline rhythm  Description: INTERVENTIONS:  - Continuous cardiac monitoring, vital signs, obtain 12 lead EKG if ordered  - Administer antiarrhythmic and heart rate control medications as ordered  - Monitor electrolytes and administer replacement therapy as ordered  Outcome: Progressing     Problem: GASTROINTESTINAL - ADULT  Goal: Minimal or absence of nausea and/or vomiting  Description: INTERVENTIONS:  - Administer IV fluids if ordered to ensure adequate hydration  - Maintain NPO status until nausea and vomiting are resolved  - Nasogastric tube if ordered  - Administer ordered antiemetic medications as needed  - Provide nonpharmacologic comfort measures as appropriate  - Advance diet as tolerated, if ordered  - Consider nutrition services referral to assist patient with adequate nutrition and appropriate food choices  Outcome: Progressing  Goal: Maintains or returns to baseline bowel function  Description: INTERVENTIONS:  - Assess bowel function  - Encourage oral fluids to ensure adequate hydration  - Administer IV fluids if ordered to ensure adequate hydration  - Administer ordered medications as needed  - Encourage mobilization and activity  - Consider nutritional services referral to assist patient with adequate nutrition and appropriate food choices  Outcome: Progressing  Goal: Maintains adequate nutritional intake  Description: INTERVENTIONS:  - Monitor percentage of each meal consumed  - Identify factors contributing to decreased intake, treat as appropriate  - Assist with meals as needed  - Monitor I&O, weight, and lab values if indicated  - Obtain nutrition services referral as needed  Outcome: Progressing  Goal: Establish and maintain optimal ostomy function  Description: INTERVENTIONS:  - Assess bowel function  - Encourage oral fluids to ensure adequate hydration  - Administer IV fluids if ordered to ensure adequate hydration   - Administer ordered medications as needed  - Encourage mobilization and activity  - Nutrition services referral to assist patient with appropriate food choices  - Assess stoma site  - Consider wound care consult   Outcome: Progressing  Goal: Oral mucous membranes remain intact  Description: INTERVENTIONS  - Assess oral mucosa and hygiene practices  - Implement preventative oral hygiene regimen  - Implement oral medicated treatments as ordered  - Initiate Nutrition services referral as needed  Outcome: Progressing     Problem: GENITOURINARY - ADULT  Goal: Maintains or returns to baseline urinary function  Description: INTERVENTIONS:  - Assess urinary function  - Encourage oral fluids to ensure adequate hydration if ordered  - Administer IV fluids as ordered to ensure adequate hydration  - Administer ordered medications as needed  - Offer frequent toileting  - Follow urinary retention protocol if ordered  Outcome: Progressing  Goal: Absence of urinary retention  Description: INTERVENTIONS:  - Assess patients ability to void and empty bladder  - Monitor I/O  - Bladder scan as needed  - Discuss with physician/AP medications to alleviate retention as needed  - Discuss catheterization for long term situations as appropriate  Outcome: Progressing  Goal: Urinary catheter remains patent  Description: INTERVENTIONS:  - Assess patency of urinary catheter  - If patient has a chronic burnett, consider changing catheter if non-functioning  - Follow guidelines for intermittent irrigation of non-functioning urinary catheter  Outcome: Progressing     Problem: METABOLIC, FLUID AND ELECTROLYTES - ADULT  Goal: Electrolytes maintained within normal limits  Description: INTERVENTIONS:  - Monitor labs and assess patient for signs and symptoms of electrolyte imbalances  - Administer electrolyte replacement as ordered  - Monitor response to electrolyte replacements, including repeat lab results as appropriate  - Instruct patient on fluid and nutrition as appropriate  Outcome: Progressing  Goal: Fluid balance maintained  Description: INTERVENTIONS:  - Monitor labs   - Monitor I/O and WT  - Instruct patient on fluid and nutrition as appropriate  - Assess for signs & symptoms of volume excess or deficit  Outcome: Progressing  Goal: Glucose maintained within target range  Description: INTERVENTIONS:  - Monitor Blood Glucose as ordered  - Assess for signs and symptoms of hyperglycemia and hypoglycemia  - Administer ordered medications to maintain glucose within target range  - Assess nutritional intake and initiate nutrition service referral as needed  Outcome: Progressing

## 2022-09-21 NOTE — NURSING NOTE
This RN noticed the patient's purewick canister was empty  According to documentation, patient was previously straight cathed at 2230 last night with no urine since  Patient has urinary retention protocol ordered  Will continue to monitor as this was straight catheterization #2

## 2022-09-21 NOTE — ASSESSMENT & PLAN NOTE
Unsure of patient's baseline so it is unclear if she is altered  Will contact her care facility to gain a better understanding     · UTI vs dementia vs unknown cause

## 2022-09-21 NOTE — PROGRESS NOTES
[address] [facility]  Progress Note Irene Romero 1933, 80 y o  female MRN: [id number]  Unit/Bed#: ED 07 Encounter: [id number]  Primary Care Provider: Trev Roth DO   Date and time admitted to hospital: 9/20/2022  6:51 PM    Hypoglycemia  Assessment & Plan  Patient experienced episode of hypoglycemia 35mg/dl around midnight last night  Patient given D50 bolus and D5 added to IVF infusion  Could be due to poor oral intake because of UTI  · Monitor glucose every 1-2 hours    Urinary tract infection  Assessment & Plan  UA positive nitrites and leukocytes  No suprapubic tenderness or CVA on exam  Afebrile  WBC 7 85 and lactic acid 1 1 normal  Unclear if patient is symptomatic or not; unable to answer questions  Will treat appropriately with abx  · Ceftriaxone 1000mg started today (9/21)  · Urine cultures pending  · Monitor temperature       Altered mental status  Assessment & Plan  Unsure of patient's baseline so it is unclear if she is altered  Will contact her care facility to gain a better understanding     · UTI vs dementia vs unknown cause    Anemia  Assessment & Plan  · HGB 9 2 today on admission; was 12 4 in May, 10 7 in July  · Per ED staff patient had melanotic stool on rectal exam which was heme positive   · Possible slow GI loss; however patient is taking supplemental iron  · Hold home plavix and VTE prophylaxis overnight  · q8H HGB ordered   · Will make NPO after midnight   · GI consulted   · Hgb 10 2 this morning (9/21)    Schizophrenia ([facility] [address] )  Assessment & Plan  · Denies SI/HI or hallucination  · Not currently on outpatient antipsychotic   · Continue outpatient f/u    Hypothyroidism  Assessment & Plan  · Continue home levothyroxine     Hyperlipidemia  Assessment & Plan  · Continue home statin    Hemiplegia ([facility] [address] )  Assessment & Plan  · Left-sided  · 2/2 prior CVA  · Continue home daily plavix, statin, and gabapentin     * Hypotension  Assessment & Plan  Patient reports development of lightheadedness at her care facility earlier today  Her SBP was found to be 70-90 at the facility and she was sent to the emergency department  Patient is without symptom/complaint at this time, although unsure of accuracy of ROS w/ possible demenita/schizophrenia baseline status  /92   Pulse 96   Temp 98 4 °F (36 9 °C) (Oral)   Resp 20   Ht 5' 7" (1 702 m)   Wt 84 7 kg (186 lb 11 7 oz)   SpO2 99%   BMI 29 25 kg/m²     · SBP reportedly 70-90's at outpatient care facility w/ patient complaining of lightheadedness  · Was 93/49 on ED presentation  · Currently 122/56 after 1L IVF bolus  · Possible slow GI blood loss with anemia as below  · Patient without complaint at this time; however has questionable demenia/schizophrenia baseline mental status  · Hold home HTN medications  · Continuous gentle IVF hydration overnight  · Closely monitor BP  · Pt /92 this morning ()        VTE Pharmacologic Prophylaxis: Lovenox    Mechanical VTE Prophylaxis in Place: Yes    Patient Centered Rounds: I have performed bedside rounds with nursing staff today  Discussions with Specialists or Other Care Team Provider: GI    Education and Discussions with Family / Patient:     Current Length of Stay: 0 day(s)    Current Patient Status: Observation     Discharge Plan / Estimated Discharge Date: Anticipated discharge tomorrow to care facility    Code Status: Level 1 - Full Code      Subjective:   Pt was seen this morning at bedside  Denying any lightheadedness, fevers, chills, chest pain, sob, or abdominal pain  However patient was nonverbal and unclear if she was able to answer questions appropriately  However patient was notably laying in bed and appeared comfortable but also lethargic       Objective:     Vitals:   Temp (24hrs), Av 4 °F (36 9 °C), Min:98 4 °F (36 9 °C), Max:98 4 °F (36 9 °C)    Temp:  [98 4 °F (36 9 °C)] 98 4 °F (36 9 °C)  HR:  [72-99] 96  Resp:  [14-22] 20  BP: ()/(47-92) 137/92  SpO2:  [96 %-99 %] 99 %  Body mass index is 29 25 kg/m²  Input and Output Summary (last 24 hours): Intake/Output Summary (Last 24 hours) at 9/21/2022 1142  Last data filed at 9/21/2022 0900  Gross per 24 hour   Intake 1100 ml   Output 575 ml   Net 525 ml       Physical Exam:     Physical Exam  Constitutional:       General: She is not in acute distress  Appearance: She is not ill-appearing  HENT:      Head: Normocephalic  Eyes:      Conjunctiva/sclera: Conjunctivae normal       Pupils: Pupils are equal, round, and reactive to light  Cardiovascular:      Rate and Rhythm: Regular rhythm  Tachycardia present  Pulses: Normal pulses  Heart sounds: Normal heart sounds  Pulmonary:      Effort: No respiratory distress  Breath sounds: No wheezing or rales  Abdominal:      General: There is no distension  Palpations: Abdomen is soft  Tenderness: There is no abdominal tenderness  There is no right CVA tenderness, left CVA tenderness or guarding  Musculoskeletal:         General: No tenderness  Right lower leg: No edema  Left lower leg: No edema  Skin:     General: Skin is warm and dry  Neurological:      Mental Status: She is alert  Comments: Unclear if patient is disoriented or at baseline          Additional Data:     Labs:  Results from last 7 days   Lab Units 09/21/22  0831 09/20/22  2308 09/20/22  1920   WBC Thousand/uL  --   --  7 85   HEMOGLOBIN g/dL 10 2*   < > 9 2*   HEMATOCRIT % 33 3*   < > 29 7*   PLATELETS Thousands/uL  --   --  165   NEUTROS PCT %  --   --  79*   LYMPHS PCT %  --   --  13*   MONOS PCT %  --   --  5   EOS PCT %  --   --  0    < > = values in this interval not displayed       Results from last 7 days   Lab Units 09/21/22  0410   SODIUM mmol/L 140   POTASSIUM mmol/L 4 2   CHLORIDE mmol/L 104   CO2 mmol/L 29   BUN mg/dL 36*   CREATININE mg/dL 0 77   ANION GAP mmol/L 7   CALCIUM mg/dL 8 7   GLUCOSE RANDOM mg/dL 81     Results from last 7 days   Lab Units 09/20/22  1920   INR  1 07     Results from last 7 days   Lab Units 09/21/22  0835 09/21/22  0532 09/21/22  0503 09/21/22  0307 09/21/22  0138 09/21/22  0031   POC GLUCOSE mg/dl 112 83 75 90 120 35*         Results from last 7 days   Lab Units 09/20/22  1920   LACTIC ACID mmol/L 1 1       Imaging: Reviewed radiology reports from this admission including: chest xray    Recent Cultures (last 7 days):           Lines/Drains:  Invasive Devices  Report    Peripheral Intravenous Line  Duration           Peripheral IV 09/20/22 Left Antecubital <1 day                Telemetry:        Last 24 Hours Medication List:   Current Facility-Administered Medications   Medication Dose Route Frequency Provider Last Rate    cefTRIAXone  1,000 mg Intravenous Q24H Ghulam Singer MD 1,000 mg (09/21/22 1045)    dextrose 5 % and sodium chloride 0 9 %  100 mL/hr Intravenous Continuous Carvel ALAN Curry 100 mL/hr (09/21/22 0337)    DULoxetine  20 mg Oral Daily [facility] [place]      ferrous sulfate  325 mg Oral Daily With Breakfast Carvel ALAN Curry      folic acid  1 mg Oral Daily [place], [place]      gabapentin  300 mg Oral TID Carvel ALAN Curry      levothyroxine  100 mcg Oral Early Morning [place], [place]      meloxicam  15 mg Oral Daily [place], [place]      oxybutynin  5 mg Oral Daily [place], [place]      pantoprazole  40 mg Intravenous Q12H [place], [place]      pravastatin  20 mg Oral Daily With [address] [place], [place]      predniSONE  60 mg Oral Daily [place], [place]      sodium chloride  100 mL/hr Intravenous Continuous Ghulam Singer  mL/hr (09/21/22 1058)        Today, Patient Was Seen By: Alvarez Phelps    ** Please Note: This note has been constructed using a voice recognition system   **

## 2022-09-21 NOTE — ASSESSMENT & PLAN NOTE
Patient reports development of lightheadedness at her care facility earlier today  Her SBP was found to be 70-90 at the facility and she was sent to the emergency department  Patient is without symptom/complaint at this time, although unsure of accuracy of ROS w/ possible demenita/schizophrenia baseline status      /52 (BP Location: Right arm)   Pulse 75   Temp 98 4 °F (36 9 °C) (Oral)   Resp 15   Wt 84 7 kg (186 lb 11 7 oz)   SpO2 98%   BMI 29 25 kg/m²     · SBP reportedly 70-90's at outpatient care facility w/ patient complaining of lightheadedness  · Was 93/49 on ED presentation  · Currently 122/56 after 1L IVF bolus  · Possible slow GI blood loss with anemia as below  · Patient without complaint at this time; however has questionable demenia/schizophrenia baseline mental status  · Hold home HTN medications  · Continuous gentle IVF hydration overnight  · Closely monitor BP

## 2022-09-21 NOTE — CONSULTS
Consultation - [facility]) [facility]  Abdiaziz Jacobsen 80 y o  female MRN: [id number]  Unit/Bed#: ED 07 Encounter: [id number]      Inpatient consult to gastroenterology  Consult performed by: Qi Mann PA-C  Consult ordered by: Neno Fox PA-C           Reason for Consult / Principal Problem: Heme positive anemia    HPI: Abdiaziz Jacobsen is a 80y o  year old female with a PMH of CVA with hemiparesis, hypothyroidism, schizophrenia, dementia, and HTN who presented from her care facility due to lightheadedness and hypotension  She is currently being treated for a UTI by [facility]  GI was consulted due to heme positive stool and anemia  Per review of labs in May, her hemoglobin was normal at 12 4 and yesterday was 9 2  She was noted to have dark stool via the rectal exam but she is reported to be on iron supplementation  Patient is unable to give much history  She denies any complaints at this time  I also tried to contact patient's son but was unsuccessful  REVIEW OF SYSTEMS: Unable to obtain      Historical Information   Past Medical History:   Diagnosis Date    Hemiplegia ([facility] [address] [facility]     Hyperlipidemia     Hypothyroidism     Kidney disorder     Osteoarthritis     Schizophrenia ([facility] [address] [facility]     Vascular disease      Past Surgical History:   Procedure Laterality Date    CATARACT EXTRACTION      FRACTURE SURGERY      AK TEMPORAL ARTERY LIGATN OR BX Bilateral 6/3/2022    Procedure: BIOPSY ARTERY TEMPORAL;  Surgeon: Vernell Ely MD;  Location:  MAIN OR;  Service: Vascular     Social History   Social History     Substance and Sexual Activity   Alcohol Use Never     Social History     Substance and Sexual Activity   Drug Use Not on file     Social History     Tobacco Use   Smoking Status Never Smoker   Smokeless Tobacco Never Used     Family History   Problem Relation Age of Onset    No Known Problems Mother     No Known Problems Father        Meds/Allergies     (Not in a hospital admission)    Current Facility-Administered Medications   Medication Dose Route Frequency    ceftriaxone (ROCEPHIN) 1 g/50 mL in dextrose IVPB  1,000 mg Intravenous Q24H    dextrose 5 % and sodium chloride 0 9 % infusion  100 mL/hr Intravenous Continuous    DULoxetine (CYMBALTA) delayed release capsule 20 mg  20 mg Oral Daily    ferrous sulfate tablet 325 mg  325 mg Oral Daily With Breakfast    folic acid (FOLVITE) tablet 1 mg  1 mg Oral Daily    gabapentin (NEURONTIN) capsule 300 mg  300 mg Oral TID    levothyroxine tablet 100 mcg  100 mcg Oral Early Morning    meloxicam (MOBIC) tablet 15 mg  15 mg Oral Daily    oxybutynin (DITROPAN-XL) 24 hr tablet 5 mg  5 mg Oral Daily    pantoprazole (PROTONIX) injection 40 mg  40 mg Intravenous Q12H SILVIA    pravastatin (PRAVACHOL) tablet 20 mg  20 mg Oral Daily With [place]    predniSONE tablet 60 mg  60 mg Oral Daily    sodium chloride 0 9 % infusion  100 mL/hr Intravenous Continuous       No Known Allergies    Objective   Blood pressure 137/92, pulse 96, temperature 98 4 °F (36 9 °C), temperature source Oral, resp  rate 20, height 5' 7" (1 702 m), weight 84 7 kg (186 lb 11 7 oz), SpO2 99 %  Intake/Output Summary (Last 24 hours) at 9/21/2022 1202  Last data filed at 9/21/2022 0900  Gross per 24 hour   Intake 1100 ml   Output 575 ml   Net 525 ml         PHYSICAL EXAM:      General Appearance:   Alert, cooperative, no distress, appears stated age    HEENT:   Normocephalic, atraumatic, anicteric   Neck:  Supple, symmetrical, trachea midline, no adenopathy;    thyroid: no enlargement/tenderness/nodules; no carotid  bruit or JVD    Lungs:   Clear to auscultation bilaterally; no rales, rhonchi or wheezing; respirations unlabored    Heart[de-identified]   S1 and S2 normal; regular rate and rhythm; no murmur, rub, or gallop     Abdomen:   Soft, non-tender, non-distended; normal bowel sounds; no masses, no organomegaly    Genitalia:   Deferred    Rectal:   Deferred    Extremities:  No cyanosis, clubbing or edema    Pulses:  2+ and symmetric all extremities    Skin:  Skin color, texture, turgor normal, no rashes or lesions    Lymph nodes:  No palpable cervical, axillary or inguinal lymphadenopathy        Lab Results:   Admission on 09/20/2022   Component Date Value    WBC 09/20/2022 7 85     RBC 09/20/2022 3 11 (A)    Hemoglobin 09/20/2022 9 2 (A)    Hematocrit 09/20/2022 29 7 (A)    MCV 09/20/2022 96     MCH 09/20/2022 29 6     MCHC 09/20/2022 31 0 (A)    RDW 09/20/2022 17 1 (A)    MPV 09/20/2022 11 1     Platelets 65/35/0578 165     nRBC 09/20/2022 2     Neutrophils Relative 09/20/2022 79 (A)    Immat GRANS % 09/20/2022 3 (A)    Lymphocytes Relative 09/20/2022 13 (A)    Monocytes Relative 09/20/2022 5     Eosinophils Relative 09/20/2022 0     Basophils Relative 09/20/2022 0     Neutrophils Absolute 09/20/2022 6 14     Immature Grans Absolute 09/20/2022 0 23 (A)    Lymphocytes Absolute 09/20/2022 1 05     Monocytes Absolute 09/20/2022 0 41     Eosinophils Absolute 09/20/2022 0 01     Basophils Absolute 09/20/2022 0 01     Protime 09/20/2022 13 7     INR 09/20/2022 1 07     PTT 09/20/2022 29     LACTIC ACID 09/20/2022 1 1     hs TnI 0hr 09/20/2022 28     Color, UA 09/20/2022 Yellow     Clarity, UA 09/20/2022 Turbid     Specific Gravity, UA 09/20/2022 1 020     pH, UA 09/20/2022 7 0     Leukocytes, UA 09/20/2022 Moderate (A)    Nitrite, UA 09/20/2022 Positive (A)    Protein, UA 09/20/2022 30 (1+) (A)    Glucose, UA 09/20/2022 Negative     Ketones, UA 09/20/2022 Negative     Urobilinogen, UA 09/20/2022 1 0     Bilirubin, UA 09/20/2022 Negative     Occult Blood, UA 09/20/2022 Moderate (A)    SARS-CoV-2 09/20/2022 Negative     INFLUENZA A PCR 09/20/2022 Negative     INFLUENZA B PCR 09/20/2022 Negative     RSV PCR 09/20/2022 Negative     Ventricular Rate 09/20/2022 77     Atrial Rate 09/20/2022 77     NM Interval 09/20/2022 150     QRSD Interval 09/20/2022 142     QT Interval 09/20/2022 450     QTC Interval 09/20/2022 509     P Axis 09/20/2022 65     QRS Axis 09/20/2022 -1     T Wave Axis 09/20/2022 48     hs TnI 2hr 09/20/2022 24     Delta 2hr hsTnI 09/20/2022 -4     Sodium 09/20/2022 142     Potassium 09/20/2022 3 6     Chloride 09/20/2022 106     CO2 09/20/2022 28     ANION GAP 09/20/2022 8     BUN 09/20/2022 39 (A)    Creatinine 09/20/2022 0 74     Glucose 09/20/2022 32 (A)    Calcium 09/20/2022 8 1 (A)    eGFR 09/20/2022 72     Hemoglobin 09/20/2022 9 1 (A)    Hematocrit 09/20/2022 29 5 (A)    Ventricular Rate 09/20/2022 72     Atrial Rate 09/20/2022 72     MI Interval 09/20/2022 156     QRSD Interval 09/20/2022 146     QT Interval 09/20/2022 474     QTC Interval 09/20/2022 519     P Axis 09/20/2022 69     QRS Axis 09/20/2022 -7     T Wave Axis 09/20/2022 49     RBC, UA 09/20/2022 Field obscured, unable to enumerate (A)    WBC, UA 09/20/2022 30-50 (A)    Epithelial Cells 09/20/2022 Field obscured, unable to enumerate (A)    Bacteria, UA 09/20/2022 Field obscured, unable to enumerate (A)    POC Glucose 09/21/2022 35 (A)    POC Glucose 09/21/2022 120     POC Glucose 09/21/2022 90     Sodium 09/21/2022 140     Potassium 09/21/2022 4 2     Chloride 09/21/2022 104     CO2 09/21/2022 29     ANION GAP 09/21/2022 7     BUN 09/21/2022 36 (A)    Creatinine 09/21/2022 0 77     Glucose 09/21/2022 81     Glucose, Fasting 09/21/2022 81     Calcium 09/21/2022 8 7     eGFR 09/21/2022 68     Hemoglobin 09/21/2022 10 2 (A)    Hematocrit 09/21/2022 33 3 (A)    POC Glucose 09/21/2022 75     POC Glucose 09/21/2022 83     POC Glucose 09/21/2022 112        Imaging Studies: I have personally reviewed pertinent imaging studies  ASSESSMENT & PLAN:    Heme positive anemia    - Patient presented with lightheadedness/AMS  She is admitted with a heme positive anemia and a UTI   - Rectal exam in ED showed black stool and was heme positive    - Per review of labs, in May, HGB was 12 4 and yesterday was 9 2  Repeat this am is 10 2   - Monitor H&H   - Protonix 40mg IV BID   - Supportive care  IVFs  Monitor patient for further obvious bleeding  She is currently hemodynamically stable at this time  - Will plan for EGD tomorrow to investigate for PUD, erosive gastritis, AVMs, malignancy  NPO after midnight  I tried to call patient's POA (son Hannah Jeffers) to discuss plan of care/discuss evaluation with EGD but there was no answer  Will try contacting him again later today as well  Thank you for this consultation  The patient will be seen and evaluated by Dr Satish Poole PA-C  9/21/2022,12:02 PM

## 2022-09-21 NOTE — PROGRESS NOTES
I discussed plan of care and plan for EGD with patient's POA and son Fidelina Whitaker by phone [phone]  He is agreeable to plan for EGD for further evaluation and will be available by phone for consent for the procedure

## 2022-09-21 NOTE — ASSESSMENT & PLAN NOTE
Patient experienced episode of hypoglycemia 35mg/dl around midnight last night  Patient given D50 bolus and D5 added to IVF infusion   Could be due to poor oral intake because of UTI  · Monitor glucose every 1-2 hours

## 2022-09-21 NOTE — PROGRESS NOTES
[facility]  Progress Note Brenda Jiménez 1933, 80 y o  female MRN: [id number]  Unit/Bed#: ED 07 Encounter: [id number]  Primary Care Provider: Tara Ferrell DO   Date and time admitted to hospital: 9/20/2022  6:51 PM    Anemia  Assessment & Plan  · HGB 9 2 today on admission; was 12 4 in May, 10 7 in July  · Per ED staff patient had melanotic stool on rectal exam which was heme positive   · Possible slow GI loss; however patient is taking supplemental iron  · Hold home plavix and VTE prophylaxis overnight  · q8H HGB ordered   · Will make NPO after midnight   · GI consulted   · Hgb 10 2 this morning (9/21)    * Hypotension  Assessment & Plan  Patient reports development of lightheadedness at her care facility earlier today  Her SBP was found to be 70-90 at the facility and she was sent to the emergency department  Patient is without symptom/complaint at this time, although unsure of accuracy of ROS w/ possible demenita/schizophrenia baseline status  /92   Pulse 96   Temp 98 4 °F (36 9 °C) (Oral)   Resp 20   Ht 5' 7" (1 702 m)   Wt 84 7 kg (186 lb 11 7 oz)   SpO2 99%   BMI 29 25 kg/m²     · SBP reportedly 70-90's at outpatient care facility w/ patient complaining of lightheadedness  · Was 93/49 on ED presentation  · Currently 122/56 after 1L IVF bolus  · Possible slow GI blood loss with anemia as below  · Patient without complaint at this time; however has questionable demenia/schizophrenia baseline mental status  · Hold home HTN medications  · Continuous gentle IVF hydration overnight  · Closely monitor BP  · Pt /92 this morning (9/21)    Urinary tract infection  Assessment & Plan  UA positive nitrites and leukocytes  No suprapubic tenderness or CVA on exam  Afebrile  WBC 7 85 and lactic acid 1 1 normal  Unclear if patient is symptomatic or not; unable to answer questions  Will treat appropriately with abx     · Ceftriaxone 1000mg started today (9/21)  · Urine cultures pending  · Monitor temperature       Hypoglycemia  Assessment & Plan  Patient experienced episode of hypoglycemia 35mg/dl around midnight last night  Patient given D50 bolus and D5 added to IVF infusion  Could be due to poor oral intake because of UTI  · Monitor glucose every 1-2 hours    Altered mental status  Assessment & Plan  Unsure of patient's baseline so it is unclear if she is altered  Will contact her care facility to gain a better understanding  · UTI vs dementia vs unknown cause    Schizophrenia ([facility] [address] )  Assessment & Plan  · Denies SI/HI or hallucination  · Not currently on outpatient antipsychotic   · Continue outpatient f/u    Hypothyroidism  Assessment & Plan  · Continue home levothyroxine     Hyperlipidemia  Assessment & Plan  · Continue home statin    Hemiplegia ([facility] [address] )  Assessment & Plan  · Left-sided  · 2/2 prior CVA  · Continue home daily plavix, statin, and gabapentin         VTE Pharmacologic Prophylaxis:   VTE Score: 4 Moderate Risk (Score 3-4) - Pharmacological DVT Prophylaxis Ordered: Enoxaparin (Lovenox)  Mechanical VTE Prophylaxis in Place: Yes    Patient Centered Rounds: I have performed bedside rounds with nursing staff today  Discussions with Specialists or Other Care Team Provider: GI    Education and Discussions with Family / Patient: Updated  (son) via phone  Current Length of Stay: 0 day(s)    Current Patient Status: Observation     Discharge Plan / Estimated Discharge Date: Anticipate discharge tomorrow to LTC facility    Code Status: Level 1 - Full Code      Subjective:   Pt was seen this morning at bedside  Denying any lightheadedness, fevers, chills, chest pain, sob, or abdominal pain  Patient was oriented to person and age, but not time and place  However patient was notably laying in bed and appeared comfortable but also lethargic       Objective:     Vitals:   Temp (24hrs), Av 4 °F (36 9 °C), Min:98 4 °F (36 9 °C), Max:98 4 °F (36 9 °C)    Temp:  [98 4 °F (36 9 °C)] 98 4 °F (36 9 °C)  HR:  [72-99] 96  Resp:  [14-22] 20  BP: ()/(47-92) 137/92  SpO2:  [96 %-99 %] 99 %  Body mass index is 29 25 kg/m²  Input and Output Summary (last 24 hours): Intake/Output Summary (Last 24 hours) at 9/21/2022 1206  Last data filed at 9/21/2022 0900  Gross per 24 hour   Intake 1100 ml   Output 575 ml   Net 525 ml       Physical Exam:     Physical Exam  Constitutional:       General: She is not in acute distress  Appearance: She is not ill-appearing  HENT:      Head: Normocephalic  Eyes:      Conjunctiva/sclera: Conjunctivae normal       Pupils: Pupils are equal, round, and reactive to light  Cardiovascular:      Rate and Rhythm: Regular rhythm  Tachycardia present  Pulses: Normal pulses  Heart sounds: Normal heart sounds  Pulmonary:      Effort: No respiratory distress  Breath sounds: No wheezing or rales  Abdominal:      General: There is no distension  Palpations: Abdomen is soft  Tenderness: There is no abdominal tenderness  There is no right CVA tenderness, left CVA tenderness or guarding  Musculoskeletal:         General: No tenderness  Right lower leg: No edema  Left lower leg: No edema  Skin:     General: Skin is warm and dry  Neurological:      Mental Status: She is alert  Comments: Unclear if patient is disoriented or at baseline          Additional Data:     Labs:  Results from last 7 days   Lab Units 09/21/22  0831 09/20/22  2308 09/20/22  1920   WBC Thousand/uL  --   --  7 85   HEMOGLOBIN g/dL 10 2*   < > 9 2*   HEMATOCRIT % 33 3*   < > 29 7*   PLATELETS Thousands/uL  --   --  165   NEUTROS PCT %  --   --  79*   LYMPHS PCT %  --   --  13*   MONOS PCT %  --   --  5   EOS PCT %  --   --  0    < > = values in this interval not displayed       Results from last 7 days   Lab Units 09/21/22  0410   SODIUM mmol/L 140   POTASSIUM mmol/L 4 2   CHLORIDE mmol/L 104   CO2 mmol/L 29   BUN mg/dL 36*   CREATININE mg/dL 0 77   ANION GAP mmol/L 7   CALCIUM mg/dL 8 7   GLUCOSE RANDOM mg/dL 81     Results from last 7 days   Lab Units 09/20/22  1920   INR  1 07     Results from last 7 days   Lab Units 09/21/22  0835 09/21/22  0532 09/21/22  0503 09/21/22  0307 09/21/22  0138 09/21/22  0031   POC GLUCOSE mg/dl 112 83 75 90 120 35*         Results from last 7 days   Lab Units 09/20/22  1920   LACTIC ACID mmol/L 1 1       Imaging: Reviewed radiology reports from this admission including: chest xray    Recent Cultures (last 7 days):           Lines/Drains:  Invasive Devices  Report    Peripheral Intravenous Line  Duration           Peripheral IV 09/20/22 Left Antecubital <1 day                Telemetry:        Last 24 Hours Medication List:   Current Facility-Administered Medications   Medication Dose Route Frequency Provider Last Rate    cefTRIAXone  1,000 mg Intravenous Q24H Estefania Scott MD 1,000 mg (09/21/22 1045)    dextrose 5 % and sodium chloride 0 9 %  100 mL/hr Intravenous Continuous Con Maza PA-C 100 mL/hr (09/21/22 0337)    DULoxetine  20 mg Oral Daily [facility]      ferrous sulfate  325 mg Oral Daily With Breakfast [facility]      folic acid  1 mg Oral Daily [place], [place]      gabapentin  300 mg Oral TID Con Maza PA-C      levothyroxine  100 mcg Oral Early Morning [place], [place]      meloxicam  15 mg Oral Daily [place], [place]      oxybutynin  5 mg Oral Daily [place], [place]      pantoprazole  40 mg Intravenous Q12H [place], [place]      pravastatin  20 mg Oral Daily With [address] [place], [place]      predniSONE  60 mg Oral Daily [place], [place]      sodium chloride  100 mL/hr Intravenous Continuous Estefania Scott  mL/hr (09/21/22 1058)        Today, Patient Was Seen By: Kyle Joyner MD    ** Please Note: This note has been constructed using a voice recognition system   **

## 2022-09-21 NOTE — ASSESSMENT & PLAN NOTE
· HGB 9 2 today on admission; was 12 4 in May, 10 7 in July  · Per ED staff patient had melanotic stool on rectal exam which was heme positive   · Possible slow GI loss; however patient is taking supplemental iron  · Hold home plavix and VTE prophylaxis overnight  · q8H HGB ordered   · Will make NPO after midnight   · GI consulted   · Hgb 10 2 this morning (9/21)

## 2022-09-21 NOTE — ASSESSMENT & PLAN NOTE
Patient reports development of lightheadedness at her care facility earlier today  Her SBP was found to be 70-90 at the facility and she was sent to the emergency department  Patient is without symptom/complaint at this time, although unsure of accuracy of ROS w/ possible demenita/schizophrenia baseline status      /92   Pulse 96   Temp 98 4 °F (36 9 °C) (Oral)   Resp 20   Ht 5' 7" (1 702 m)   Wt 84 7 kg (186 lb 11 7 oz)   SpO2 99%   BMI 29 25 kg/m²     · SBP reportedly 70-90's at outpatient care facility w/ patient complaining of lightheadedness  · Was 93/49 on ED presentation  · Currently 122/56 after 1L IVF bolus  · Possible slow GI blood loss with anemia as below  · Patient without complaint at this time; however has questionable demenia/schizophrenia baseline mental status  · Hold home HTN medications  · Continuous gentle IVF hydration overnight  · Closely monitor BP  · Pt /92 this morning (9/21)

## 2022-09-21 NOTE — ASSESSMENT & PLAN NOTE
· HGB 9 2 today; was 12 4 in May, 10 7 in July  · Per ED staff patient had melanotic stool on rectal exam which was heme positive   · Possible slow GI loss; however patient is taking supplemental iron  · Hold home plavix and VTE prophylaxis overnight  · q8H HGB ordered   · Will make NPO after midnight   · GI consulted

## 2022-09-21 NOTE — ASSESSMENT & PLAN NOTE
· Denies SI/HI or hallucination  · Not currently on outpatient antipsychotic   · Continue outpatient f/u

## 2022-09-22 ENCOUNTER — APPOINTMENT (OUTPATIENT)
Dept: GASTROENTEROLOGY | Facility: HOSPITAL | Age: 87
DRG: 377 | End: 2022-09-22
Payer: MEDICARE

## 2022-09-22 ENCOUNTER — ANESTHESIA (INPATIENT)
Dept: GASTROENTEROLOGY | Facility: HOSPITAL | Age: 87
DRG: 377 | End: 2022-09-22
Payer: MEDICARE

## 2022-09-22 ENCOUNTER — ANESTHESIA EVENT (INPATIENT)
Dept: GASTROENTEROLOGY | Facility: HOSPITAL | Age: 87
DRG: 377 | End: 2022-09-22
Payer: MEDICARE

## 2022-09-22 LAB
ANION GAP SERPL CALCULATED.3IONS-SCNC: 6 MMOL/L (ref 4–13)
BASOPHILS # BLD AUTO: 0.01 THOUSANDS/ΜL (ref 0–0.1)
BASOPHILS NFR BLD AUTO: 0 % (ref 0–1)
BUN SERPL-MCNC: 19 MG/DL (ref 5–25)
CALCIUM SERPL-MCNC: 7.7 MG/DL (ref 8.3–10.1)
CHLORIDE SERPL-SCNC: 107 MMOL/L (ref 96–108)
CO2 SERPL-SCNC: 27 MMOL/L (ref 21–32)
CREAT SERPL-MCNC: 0.73 MG/DL (ref 0.6–1.3)
EOSINOPHIL # BLD AUTO: 0 THOUSAND/ΜL (ref 0–0.61)
EOSINOPHIL NFR BLD AUTO: 0 % (ref 0–6)
ERYTHROCYTE [DISTWIDTH] IN BLOOD BY AUTOMATED COUNT: 16.5 % (ref 11.6–15.1)
GFR SERPL CREATININE-BSD FRML MDRD: 73 ML/MIN/1.73SQ M
GLUCOSE SERPL-MCNC: 100 MG/DL (ref 65–140)
GLUCOSE SERPL-MCNC: 106 MG/DL (ref 65–140)
GLUCOSE SERPL-MCNC: 108 MG/DL (ref 65–140)
GLUCOSE SERPL-MCNC: 121 MG/DL (ref 65–140)
GLUCOSE SERPL-MCNC: 125 MG/DL (ref 65–140)
GLUCOSE SERPL-MCNC: 141 MG/DL (ref 65–140)
GLUCOSE SERPL-MCNC: 163 MG/DL (ref 65–140)
GLUCOSE SERPL-MCNC: 230 MG/DL (ref 65–140)
GLUCOSE SERPL-MCNC: 350 MG/DL (ref 65–140)
GLUCOSE SERPL-MCNC: 67 MG/DL (ref 65–140)
GLUCOSE SERPL-MCNC: 68 MG/DL (ref 65–140)
GLUCOSE SERPL-MCNC: 83 MG/DL (ref 65–140)
GLUCOSE SERPL-MCNC: 84 MG/DL (ref 65–140)
HCT VFR BLD AUTO: 29.8 % (ref 34.8–46.1)
HCT VFR BLD AUTO: 31.8 % (ref 34.8–46.1)
HCT VFR BLD AUTO: 34.6 % (ref 34.8–46.1)
HGB BLD-MCNC: 10.5 G/DL (ref 11.5–15.4)
HGB BLD-MCNC: 9 G/DL (ref 11.5–15.4)
HGB BLD-MCNC: 9.8 G/DL (ref 11.5–15.4)
IMM GRANULOCYTES # BLD AUTO: 0.14 THOUSAND/UL (ref 0–0.2)
IMM GRANULOCYTES NFR BLD AUTO: 2 % (ref 0–2)
LYMPHOCYTES # BLD AUTO: 1.02 THOUSANDS/ΜL (ref 0.6–4.47)
LYMPHOCYTES NFR BLD AUTO: 12 % (ref 14–44)
MCH RBC QN AUTO: 28.8 PG (ref 26.8–34.3)
MCHC RBC AUTO-ENTMCNC: 30.2 G/DL (ref 31.4–37.4)
MCV RBC AUTO: 96 FL (ref 82–98)
MONOCYTES # BLD AUTO: 0.38 THOUSAND/ΜL (ref 0.17–1.22)
MONOCYTES NFR BLD AUTO: 5 % (ref 4–12)
NEUTROPHILS # BLD AUTO: 6.91 THOUSANDS/ΜL (ref 1.85–7.62)
NEUTS SEG NFR BLD AUTO: 81 % (ref 43–75)
NRBC BLD AUTO-RTO: 1 /100 WBCS
PLATELET # BLD AUTO: 154 THOUSANDS/UL (ref 149–390)
PMV BLD AUTO: 9.8 FL (ref 8.9–12.7)
POTASSIUM SERPL-SCNC: 3.7 MMOL/L (ref 3.5–5.3)
RBC # BLD AUTO: 3.12 MILLION/UL (ref 3.81–5.12)
SODIUM SERPL-SCNC: 140 MMOL/L (ref 135–147)
WBC # BLD AUTO: 8.46 THOUSAND/UL (ref 4.31–10.16)

## 2022-09-22 PROCEDURE — 85025 COMPLETE CBC W/AUTO DIFF WBC: CPT | Performed by: INTERNAL MEDICINE

## 2022-09-22 PROCEDURE — 99233 SBSQ HOSP IP/OBS HIGH 50: CPT | Performed by: STUDENT IN AN ORGANIZED HEALTH CARE EDUCATION/TRAINING PROGRAM

## 2022-09-22 PROCEDURE — 85014 HEMATOCRIT: CPT | Performed by: INTERNAL MEDICINE

## 2022-09-22 PROCEDURE — 80048 BASIC METABOLIC PNL TOTAL CA: CPT | Performed by: INTERNAL MEDICINE

## 2022-09-22 PROCEDURE — 85018 HEMOGLOBIN: CPT | Performed by: INTERNAL MEDICINE

## 2022-09-22 PROCEDURE — C9113 INJ PANTOPRAZOLE SODIUM, VIA: HCPCS | Performed by: INTERNAL MEDICINE

## 2022-09-22 PROCEDURE — 0W3P8ZZ CONTROL BLEEDING IN GASTROINTESTINAL TRACT, VIA NATURAL OR ARTIFICIAL OPENING ENDOSCOPIC: ICD-10-PCS | Performed by: INTERNAL MEDICINE

## 2022-09-22 PROCEDURE — C9113 INJ PANTOPRAZOLE SODIUM, VIA: HCPCS

## 2022-09-22 PROCEDURE — 85014 HEMATOCRIT: CPT

## 2022-09-22 PROCEDURE — 43255 EGD CONTROL BLEEDING ANY: CPT | Performed by: INTERNAL MEDICINE

## 2022-09-22 PROCEDURE — 82948 REAGENT STRIP/BLOOD GLUCOSE: CPT

## 2022-09-22 PROCEDURE — 85018 HEMOGLOBIN: CPT

## 2022-09-22 RX ORDER — DEXTROSE MONOHYDRATE 25 G/50ML
INJECTION, SOLUTION INTRAVENOUS
Status: COMPLETED
Start: 2022-09-22 | End: 2022-09-22

## 2022-09-22 RX ORDER — DEXTROSE AND SODIUM CHLORIDE 5; .45 G/100ML; G/100ML
100 INJECTION, SOLUTION INTRAVENOUS CONTINUOUS
Status: DISCONTINUED | OUTPATIENT
Start: 2022-09-22 | End: 2022-09-22

## 2022-09-22 RX ORDER — PROPOFOL 10 MG/ML
INJECTION, EMULSION INTRAVENOUS AS NEEDED
Status: DISCONTINUED | OUTPATIENT
Start: 2022-09-22 | End: 2022-09-22

## 2022-09-22 RX ORDER — SODIUM CHLORIDE, SODIUM LACTATE, POTASSIUM CHLORIDE, CALCIUM CHLORIDE 600; 310; 30; 20 MG/100ML; MG/100ML; MG/100ML; MG/100ML
50 INJECTION, SOLUTION INTRAVENOUS CONTINUOUS
Status: DISCONTINUED | OUTPATIENT
Start: 2022-09-22 | End: 2022-09-23 | Stop reason: HOSPADM

## 2022-09-22 RX ORDER — LIDOCAINE HYDROCHLORIDE 10 MG/ML
0.5 INJECTION, SOLUTION EPIDURAL; INFILTRATION; INTRACAUDAL; PERINEURAL ONCE AS NEEDED
Status: DISCONTINUED | OUTPATIENT
Start: 2022-09-22 | End: 2022-09-22

## 2022-09-22 RX ORDER — LIDOCAINE HYDROCHLORIDE 20 MG/ML
INJECTION, SOLUTION EPIDURAL; INFILTRATION; INTRACAUDAL; PERINEURAL AS NEEDED
Status: DISCONTINUED | OUTPATIENT
Start: 2022-09-22 | End: 2022-09-22

## 2022-09-22 RX ADMIN — PROPOFOL 10 MG: 10 INJECTION, EMULSION INTRAVENOUS at 12:50

## 2022-09-22 RX ADMIN — ENOXAPARIN SODIUM 40 MG: 40 INJECTION SUBCUTANEOUS at 09:30

## 2022-09-22 RX ADMIN — PROPOFOL 50 MG: 10 INJECTION, EMULSION INTRAVENOUS at 12:47

## 2022-09-22 RX ADMIN — CEFTRIAXONE SODIUM 1000 MG: 10 INJECTION, POWDER, FOR SOLUTION INTRAVENOUS at 10:23

## 2022-09-22 RX ADMIN — DULOXETINE 20 MG: 20 CAPSULE, DELAYED RELEASE ORAL at 09:36

## 2022-09-22 RX ADMIN — FOLIC ACID 1 MG: 1 TABLET ORAL at 09:35

## 2022-09-22 RX ADMIN — LIDOCAINE HYDROCHLORIDE 100 MG: 20 INJECTION, SOLUTION EPIDURAL; INFILTRATION; INTRACAUDAL at 12:47

## 2022-09-22 RX ADMIN — DEXTROSE MONOHYDRATE 25 ML: 25 INJECTION, SOLUTION INTRAVENOUS at 04:54

## 2022-09-22 RX ADMIN — SODIUM CHLORIDE, SODIUM LACTATE, POTASSIUM CHLORIDE, AND CALCIUM CHLORIDE 50 ML/HR: .6; .31; .03; .02 INJECTION, SOLUTION INTRAVENOUS at 14:21

## 2022-09-22 RX ADMIN — GABAPENTIN 300 MG: 300 CAPSULE ORAL at 21:16

## 2022-09-22 RX ADMIN — PROPOFOL 10 MG: 10 INJECTION, EMULSION INTRAVENOUS at 12:53

## 2022-09-22 RX ADMIN — DEXTROSE AND SODIUM CHLORIDE 100 ML/HR: 5; .45 INJECTION, SOLUTION INTRAVENOUS at 04:57

## 2022-09-22 RX ADMIN — PREDNISONE 60 MG: 20 TABLET ORAL at 09:35

## 2022-09-22 RX ADMIN — PANTOPRAZOLE SODIUM 40 MG: 40 INJECTION, POWDER, FOR SOLUTION INTRAVENOUS at 21:16

## 2022-09-22 RX ADMIN — OXYBUTYNIN 5 MG: 5 TABLET, FILM COATED, EXTENDED RELEASE ORAL at 09:35

## 2022-09-22 RX ADMIN — PROPOFOL 10 MG: 10 INJECTION, EMULSION INTRAVENOUS at 12:56

## 2022-09-22 RX ADMIN — GABAPENTIN 300 MG: 300 CAPSULE ORAL at 09:35

## 2022-09-22 RX ADMIN — GABAPENTIN 300 MG: 300 CAPSULE ORAL at 15:44

## 2022-09-22 RX ADMIN — PANTOPRAZOLE SODIUM 40 MG: 40 INJECTION, POWDER, FOR SOLUTION INTRAVENOUS at 09:30

## 2022-09-22 RX ADMIN — PRAVASTATIN SODIUM 20 MG: 20 TABLET ORAL at 15:45

## 2022-09-22 RX ADMIN — SODIUM CHLORIDE, SODIUM LACTATE, POTASSIUM CHLORIDE, AND CALCIUM CHLORIDE: .6; .31; .03; .02 INJECTION, SOLUTION INTRAVENOUS at 12:10

## 2022-09-22 NOTE — PROGRESS NOTES
[facility]  Progress Note Gloria Mary 1933, 80 y o  female MRN: [id number]  Unit/Bed#: -[place] Encounter: [id number]  Primary Care Provider: Santo Sanchez DO   Date and time admitted to hospital: 9/20/2022  6:51 PM    Hypoglycemia  Assessment & Plan  Patient experienced episode of hypoglycemia 35mg/dl around midnight last night  Patient given D50 bolus and D5 added to IVF infusion  Could be due to poor oral intake because of UTI  · Monitor glucose every 1-2 hours    Urinary tract infection  Assessment & Plan  UA positive nitrites and leukocytes  No suprapubic tenderness or CVA on exam  Afebrile  WBC 7 85 and lactic acid 1 1 normal  Unclear if patient is symptomatic or not; unable to answer questions  Will treat appropriately with abx  · Ceftriaxone 1000mg started today (9/21)  · Urine cultures pending  · Monitor temperature     Patient mental status much improved and says she is feeling much better this morning (9/22)  Afebrile  WBC 8 46    · Continue with ceftriaxone 1000mg today and monitor any change in mental status, symptoms, vitals      Altered mental status  Assessment & Plan  Unsure of patient's baseline so it is unclear if she is altered  Will contact her care facility to gain a better understanding  · UTI vs dementia vs unknown cause    Patient's mental status markedly improved this morning (9/22); able to answer questions and says she is feeling much better     · Continue with the antibiotics course for UTI    Anemia  Assessment & Plan  · HGB 9 2 today on admission; was 12 4 in May, 10 7 in July  · Per ED staff patient had melanotic stool on rectal exam which was heme positive   · Possible slow GI loss; however patient is taking supplemental iron  · Hold home plavix and VTE prophylaxis overnight  · q8H HGB ordered   · Will make NPO after midnight   · GI consulted   · Hgb 10 2 this morning (9/21)  · Hgb 9 8 this morning (9/22)  · GI planning for EGD today Schizophrenia ([facility] [address] )  Assessment & Plan  · Denies SI/HI or hallucination  · Not currently on outpatient antipsychotic   · Continue outpatient f/u    Hypothyroidism  Assessment & Plan  · Continue home levothyroxine     Hyperlipidemia  Assessment & Plan  · Continue home statin    Hemiplegia ([facility] [address] )  Assessment & Plan  · Left-sided  · 2/2 prior CVA  · Continue home daily plavix, statin, and gabapentin     * Hypotension  Assessment & Plan  Patient reports development of lightheadedness at her care facility earlier today  Her SBP was found to be 70-90 at the facility and she was sent to the emergency department  Patient is without symptom/complaint at this time, although unsure of accuracy of ROS w/ possible demenita/schizophrenia baseline status  /70   Pulse 85   Temp 97 8 °F (36 6 °C)   Resp 19   Ht 5' 7" (1 702 m)   Wt 84 7 kg (186 lb 11 7 oz)   SpO2 98%   BMI 29 25 kg/m²     · SBP reportedly 70-90's at outpatient care facility w/ patient complaining of lightheadedness  · Was 93/49 on ED presentation  · Currently 122/56 after 1L IVF bolus  · Possible slow GI blood loss with anemia as below  · Patient without complaint at this time; however has questionable demenia/schizophrenia baseline mental status  · Hold home HTN medications  · Continuous gentle IVF hydration overnight  · Closely monitor BP  · Pt /92 this morning (9/21)          VTE Pharmacologic Prophylaxis:   VTE Score: 4 Moderate Risk (Score 3-4) - Pharmacological DVT Prophylaxis Ordered: Enoxaparin (Lovenox)  Mechanical VTE Prophylaxis in Place: Yes    Patient Centered Rounds: I have performed bedside rounds with nursing staff today      Discussions with Specialists or Other Care Team Provider: GI    Education and Discussions with Family / Patient: Discussed with patient at bedside today    Current Length of Stay: 1 day(s)    Current Patient Status: Inpatient     Discharge Plan / Estimated Discharge Date: Anticipate discharge within 24-48 hours back to care facility  Code Status: Level 1 - Full Code      Subjective:   Pt was seen this morning at bedside  Her mental status is markedly improved and she says she is feeling much compared to yesterday  Patient was able to appropriately respond to questions and is denying any complaints this morning other than feeling a little cold  Denied any feelings of fevers, chills, chest pain, sob, dysuria, N/V  Objective:     Vitals:   Temp (24hrs), Av 2 °F (36 8 °C), Min:97 8 °F (36 6 °C), Max:98 9 °F (37 2 °C)    Temp:  [97 8 °F (36 6 °C)-98 9 °F (37 2 °C)] 97 8 °F (36 6 °C)  HR:  [71-99] 85  Resp:  [16-19] 19  BP: (119-156)/(57-89) 136/70  SpO2:  [96 %-100 %] 98 %  Body mass index is 29 25 kg/m²  Input and Output Summary (last 24 hours): Intake/Output Summary (Last 24 hours) at 2022 1117  Last data filed at 2022 1001  Gross per 24 hour   Intake 1999 ml   Output 1345 ml   Net 654 ml       Physical Exam:     Physical Exam  Constitutional:       General: She is not in acute distress  Appearance: She is not ill-appearing  HENT:      Head: Normocephalic and atraumatic  Eyes:      Conjunctiva/sclera: Conjunctivae normal       Pupils: Pupils are equal, round, and reactive to light  Cardiovascular:      Rate and Rhythm: Normal rate and regular rhythm  Pulses: Normal pulses  Heart sounds: Normal heart sounds  Pulmonary:      Effort: Pulmonary effort is normal  No respiratory distress  Breath sounds: Normal breath sounds  No wheezing, rhonchi or rales  Abdominal:      General: There is no distension  Palpations: Abdomen is soft  Tenderness: There is no abdominal tenderness  There is no guarding  Musculoskeletal:      Right lower leg: No edema  Left lower leg: No edema  Skin:     General: Skin is warm and dry  Neurological:      Mental Status: She is alert  Comments: Marked improvement  Oriented x2             Additional Data: Labs:  Results from last 7 days   Lab Units 09/22/22  0852 09/22/22  0618   WBC Thousand/uL  --  8 46   HEMOGLOBIN g/dL 9 8* 9 0*   HEMATOCRIT % 31 8* 29 8*   PLATELETS Thousands/uL  --  154   NEUTROS PCT %  --  81*   LYMPHS PCT %  --  12*   MONOS PCT %  --  5   EOS PCT %  --  0     Results from last 7 days   Lab Units 09/22/22  0618   SODIUM mmol/L 140   POTASSIUM mmol/L 3 7   CHLORIDE mmol/L 107   CO2 mmol/L 27   BUN mg/dL 19   CREATININE mg/dL 0 73   ANION GAP mmol/L 6   CALCIUM mg/dL 7 7*   GLUCOSE RANDOM mg/dL 125     Results from last 7 days   Lab Units 09/20/22  1920   INR  1 07     Results from last 7 days   Lab Units 09/22/22  1029 09/22/22  0758 09/22/22  0605 09/22/22  0509 09/22/22  0435 09/22/22  0402 09/22/22  0254 09/22/22  0028 09/21/22  2225 09/21/22  2050 09/21/22  1819 09/21/22  1500   POC GLUCOSE mg/dl 106 121 141* 163* 68 67 84 83 97 122 107 96         Results from last 7 days   Lab Units 09/20/22  1920   LACTIC ACID mmol/L 1 1       Imaging: Reviewed radiology reports from this admission including: xray(s)    Recent Cultures (last 7 days):           Lines/Drains:  Invasive Devices  Report    Peripheral Intravenous Line  Duration           Peripheral IV 09/20/22 Left Antecubital 1 day          Drain  Duration           External Urinary Catheter 1 day                Telemetry:        Last 24 Hours Medication List:   Current Facility-Administered Medications   Medication Dose Route Frequency Provider Last Rate    cefTRIAXone  1,000 mg Intravenous Q24H Nazanin Sim MD 1,000 mg (09/22/22 1023)    dextrose 5 % and sodium chloride 0 45 %  100 mL/hr Intravenous Continuous Cassie Mascorro PA-C 100 mL/hr (09/22/22 0457)    DULoxetine  20 mg Oral Daily Kunal Freed PA-C      enoxaparin  40 mg Subcutaneous Q24H [address] Godfrey Nicholson MD      ferrous sulfate  325 mg Oral Daily With Breakfast Kunal jacques PA-C      folic acid  1 mg Oral Daily Kunal jacques PA-C      gabapentin  300 mg Oral TID Randa Mcgarry PA-C      glucose  16 g Oral Once Ruperto Schmidt MD      levothyroxine  100 mcg Oral [place]      oxybutynin  5 mg Oral Daily Araceli Sa[place], [place]      pantoprazole  40 mg Intravenous Q12H [place], [place]      pravastatin  20 mg Oral Daily With [address], [place]      predniSONE  60 mg Oral Daily [place], [place]          Today, Patient Was Seen By: Juan German    ** Please Note: This note has been constructed using a voice recognition system   **

## 2022-09-22 NOTE — CASE MANAGEMENT
Case Management Discharge Planning Note    Patient name Cece Nixon  Location [address] 322/-[id number] MRN [id number]  : 1933 Date 2022       Current Admission Date: 2022  Current Admission Diagnosis:Hypotension   Patient Active Problem List    Diagnosis Date Noted    Altered mental status 2022    Urinary tract infection 2022    Hypoglycemia 2022    Hypotension 2022    Anemia 2022    Hemiplegia ([address] ) 2022    Hyperlipidemia 2022    Hypothyroidism 2022    Schizophrenia ([facility] [address] ) 2022    Osteoarthritis 2022    Kidney disorder 2022    Primary hypertension 2022    Temporal arteritis ([facility] [address] ) 2022    Overactive bladder 2022    Multiple stable closed lateral compression fractures of pelvis ([facility] [address] ) 10/13/2021      LOS (days): 1  Geometric Mean LOS (GMLOS) (days): 3 00  Days to GMLOS:2 2     OBJECTIVE:  Risk of Unplanned Readmission Score: 14 22         Current admission status: Inpatient   Preferred Pharmacy:   [address] [facility], [address] [facility]  [address] [address]  Phone: [phone] Fax: [phone]    Primary Care Provider: Leann Chery DO    Primary Insurance: MEDICARE  Secondary Insurance: [address] [place] [address]    DISCHARGE DETAILS:    Discharge planning discussed with[de-identified] LMOVM for kimmy Alcocer of Choice: Yes  Comments - Freedom of Choice: Patient reviewed w/ SLIM during Care Coordination rounds; she's anticipated for dc in 24 hrs  She's currently on IV abx and getting IVF for UTI  It appears that she is a LTC resident at [facility] so CM did send a referral to them in [facility]  CM called and LMOVM for kimmy Bond, asking for call back to confirm discharge plan  CM will continue to follow    CM contacted family/caregiver?: Yes (LMOVM)     Contacts  Patient Contacts: Rich  Relationship to Patient[de-identified] Family  Contact Method: Phone  Phone Number: [phone]  Reason/Outcome: Continuity of Care, Discharge Planning, Referral    Other Referral/Resources/Interventions Provided:  Interventions: SNF  Referral Comments: Referral sent to [facility] via [address]; awaiting response back       Treatment Team Recommendation: SNF  Discharge Destination Plan[de-identified] SNF  Transport at Discharge : [facility] [FreeTextEntry1] : follow up [de-identified] : Mr. AURORA SIBLEY is a 83 year old male presenting for a follow up\par s/p Colectomy, Rectal bleed and severe anemia.\par Hypotension resolved. On metorprolol.\par Seasonal allergies requesting Flonase\par Diet has improved but states he has to chop the meat.\par  Low protein/ albumin. taking protein shakes once a day that his wife makes.\par \par He had AAA repair by Dr Martinez \par Was on Plavix was discontinued by Vascular surgeon because of extensive ecchymosis. 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

## 2022-09-22 NOTE — ASSESSMENT & PLAN NOTE
Unsure of patient's baseline so it is unclear if she is altered  Will contact her care facility to gain a better understanding     · UTI exacerbating dementia vs baseline  · Currently improving with supportive care  · AAOx2

## 2022-09-22 NOTE — NURSING NOTE
This RN spoke with Conor Schaffer from [facility] to give update on patient  All questions and/or concerns were appropriately addressed

## 2022-09-22 NOTE — PLAN OF CARE
Problem: CARDIOVASCULAR - ADULT  Goal: Maintains optimal cardiac output and hemodynamic stability  Description: INTERVENTIONS:  - Monitor I/O, vital signs and rhythm  - Monitor for S/S and trends of decreased cardiac output  - Administer and titrate ordered vasoactive medications to optimize hemodynamic stability  - Assess quality of pulses, skin color and temperature  - Assess for signs of decreased coronary artery perfusion  - Instruct patient to report change in severity of symptoms  Outcome: Progressing  Goal: Absence of cardiac dysrhythmias or at baseline rhythm  Description: INTERVENTIONS:  - Continuous cardiac monitoring, vital signs, obtain 12 lead EKG if ordered  - Administer antiarrhythmic and heart rate control medications as ordered  - Monitor electrolytes and administer replacement therapy as ordered  Outcome: Progressing     Problem: GASTROINTESTINAL - ADULT  Goal: Minimal or absence of nausea and/or vomiting  Description: INTERVENTIONS:  - Administer IV fluids if ordered to ensure adequate hydration  - Maintain NPO status until nausea and vomiting are resolved  - Nasogastric tube if ordered  - Administer ordered antiemetic medications as needed  - Provide nonpharmacologic comfort measures as appropriate  - Advance diet as tolerated, if ordered  - Consider nutrition services referral to assist patient with adequate nutrition and appropriate food choices  Outcome: Progressing  Goal: Maintains or returns to baseline bowel function  Description: INTERVENTIONS:  - Assess bowel function  - Encourage oral fluids to ensure adequate hydration  - Administer IV fluids if ordered to ensure adequate hydration  - Administer ordered medications as needed  - Encourage mobilization and activity  - Consider nutritional services referral to assist patient with adequate nutrition and appropriate food choices  Outcome: Progressing  Goal: Maintains adequate nutritional intake  Description: INTERVENTIONS:  - Monitor percentage of each meal consumed  - Identify factors contributing to decreased intake, treat as appropriate  - Assist with meals as needed  - Monitor I&O, weight, and lab values if indicated  - Obtain nutrition services referral as needed  Outcome: Progressing  Goal: Establish and maintain optimal ostomy function  Description: INTERVENTIONS:  - Assess bowel function  - Encourage oral fluids to ensure adequate hydration  - Administer IV fluids if ordered to ensure adequate hydration   - Administer ordered medications as needed  - Encourage mobilization and activity  - Nutrition services referral to assist patient with appropriate food choices  - Assess stoma site  - Consider wound care consult   Outcome: Progressing  Goal: Oral mucous membranes remain intact  Description: INTERVENTIONS  - Assess oral mucosa and hygiene practices  - Implement preventative oral hygiene regimen  - Implement oral medicated treatments as ordered  - Initiate Nutrition services referral as needed  Outcome: Progressing     Problem: GENITOURINARY - ADULT  Goal: Maintains or returns to baseline urinary function  Description: INTERVENTIONS:  - Assess urinary function  - Encourage oral fluids to ensure adequate hydration if ordered  - Administer IV fluids as ordered to ensure adequate hydration  - Administer ordered medications as needed  - Offer frequent toileting  - Follow urinary retention protocol if ordered  Outcome: Progressing  Goal: Absence of urinary retention  Description: INTERVENTIONS:  - Assess patients ability to void and empty bladder  - Monitor I/O  - Bladder scan as needed  - Discuss with physician/AP medications to alleviate retention as needed  - Discuss catheterization for long term situations as appropriate  Outcome: Progressing  Goal: Urinary catheter remains patent  Description: INTERVENTIONS:  - Assess patency of urinary catheter  - If patient has a chronic burnett, consider changing catheter if non-functioning  - Follow guidelines for intermittent irrigation of non-functioning urinary catheter  Outcome: Progressing

## 2022-09-22 NOTE — ASSESSMENT & PLAN NOTE
Patient experienced episode of hypoglycemia 35mg/dl around midnight last night  Patient given D50 bolus and D5 added to IVF infusion   Could be due to poor oral intake because of UTI  · Now stabilizing with antibiotics and D5 fluids  · Monitor off fluids

## 2022-09-22 NOTE — ASSESSMENT & PLAN NOTE
Unsure of patient's baseline so it is unclear if she is altered  Will contact her care facility to gain a better understanding  · UTI vs dementia vs unknown cause    Patient's mental status markedly improved this morning (9/22); able to answer questions and says she is feeling much better     · Continue with the antibiotics course for UTI

## 2022-09-22 NOTE — ASSESSMENT & PLAN NOTE
· HGB 9 2 today on admission; was 12 4 in May, 10 7 in July  · Per ED staff patient had melanotic stool on rectal exam which was heme positive   · Possible slow GI loss; however patient is taking supplemental iron  · Hold home plavix and VTE prophylaxis overnight  · q8H HGB ordered   · Will make NPO after midnight   · GI consulted   · Hgb 10 2 this morning (9/21)  · Hgb 9 8 this morning (9/22)  · GI planning for EGD today

## 2022-09-22 NOTE — ANESTHESIA POSTPROCEDURE EVALUATION
Post-Op Assessment Note    CV Status:  Stable  Pain Score: 0    Pain management: adequate     Mental Status:  Alert and awake   Hydration Status:  Euvolemic   PONV Controlled:  Controlled   Airway Patency:  Patent      Post Op Vitals Reviewed: Yes      Staff: Anesthesiologist, CRNA         No complications documented      /52   Temp      Pulse  80   Resp   18   SpO2   96%

## 2022-09-22 NOTE — PROGRESS NOTES
[address] [facility]  Progress Note Charbel Arcos 1933, 80 y o  female MRN: [id number]  Unit/Bed#: -01 Encounter: [id number]  Primary Care Provider: Onur Marie DO   Date and time admitted to hospital: 9/20/2022  6:15 PM  Metabolic encephalopathy due to multiple factors- UTI, anemia, melena, hypoglycemia a/e/b AMS and lethargy treated with high fall risk, GI consult, fingerstick glucose q2hr and serial labs        * Hypotension  Assessment & Plan  Patient reports development of lightheadedness at her care facility earlier today  Her SBP was found to be 70-90 at the facility and she was sent to the emergency department  Patient is without symptom/complaint at this time, although unsure of accuracy of ROS w/ possible demenita/schizophrenia baseline status  /56   Pulse 80   Temp (!) 97 1 °F (36 2 °C) (Temporal)   Resp 18   Ht 5' 7" (1 702 m)   Wt 84 7 kg (186 lb 11 7 oz)   SpO2 98%   BMI 29 25 kg/m²     · SBP reportedly 70-90's at outpatient care facility w/ patient complaining of lightheadedness  · Likely volume depletion from UTI and poor PO intake  · Status post IV resuscitation  · Blood pressure now stable  · Monitor     Altered mental status  Assessment & Plan  Unsure of patient's baseline so it is unclear if she is altered  Will contact her care facility to gain a better understanding  · UTI exacerbating dementia vs baseline  · Currently improving with supportive care  · AAOx2     Anemia  Assessment & Plan  · Acute on chronic anemia   · GI consulted, plan for EGD  · Follow up results  · Monitor hemoglobin     Hypoglycemia  Assessment & Plan  Patient experienced episode of hypoglycemia 35mg/dl around midnight last night  Patient given D50 bolus and D5 added to IVF infusion   Could be due to poor oral intake because of UTI  · Now stabilizing with antibiotics and D5 fluids  · Monitor off fluids     Hemiplegia (HCC)  Assessment & Plan  · Left-sided  · 2/2 prior CVA  · Continue home daily plavix, statin, and gabapentin           VTE Pharmacologic Prophylaxis: VTE Score: 4 Moderate Risk (Score 3-4) - Pharmacological DVT Prophylaxis Ordered: enoxaparin (Lovenox)  Patient Centered Rounds: I performed bedside rounds with nursing staff today  Discussions with Specialists or Other Care Team Provider: kya    Time Spent for Care: 30 minutes  More than 50% of total time spent on counseling and coordination of care as described above  Current Length of Stay: 1 day(s)  Current Patient Status: Inpatient   Certification Statement: The patient will continue to require additional inpatient hospital stay due to IV antibiotics, EGD  Discharge Plan: Anticipate discharge in 24-48 hrs to rehab facility  Code Status: Level 1 - Full Code    Subjective:   Patient feels better today     Objective:     Vitals:   Temp (24hrs), Av °F (36 7 °C), Min:97 1 °F (36 2 °C), Max:98 9 °F (37 2 °C)    Temp:  [97 1 °F (36 2 °C)-98 9 °F (37 2 °C)] 97 1 °F (36 2 °C)  HR:  [71-99] 78  Resp:  [17-19] 18  BP: (110-156)/(52-89) 113/55  SpO2:  [96 %-100 %] 99 %  Body mass index is 29 25 kg/m²  Input and Output Summary (last 24 hours): Intake/Output Summary (Last 24 hours) at 2022 1333  Last data filed at 2022 1259  Gross per 24 hour   Intake 2299 ml   Output 1345 ml   Net 954 ml       Physical Exam:   Physical Exam  Constitutional:       General: She is not in acute distress  Appearance: Normal appearance  She is not toxic-appearing  Cardiovascular:      Rate and Rhythm: Normal rate and regular rhythm  Heart sounds: Normal heart sounds  No murmur heard  Pulmonary:      Effort: Pulmonary effort is normal  No respiratory distress  Breath sounds: Normal breath sounds  No wheezing  Abdominal:      General: Abdomen is flat  There is no distension  Palpations: Abdomen is soft  Tenderness: There is no abdominal tenderness     Neurological:      General: No focal deficit present  Mental Status: She is alert  Motor: Weakness present  Comments: AOx2          Additional Data:     Labs:  Results from last 7 days   Lab Units 09/22/22  0852 09/22/22  0618   WBC Thousand/uL  --  8 46   HEMOGLOBIN g/dL 9 8* 9 0*   HEMATOCRIT % 31 8* 29 8*   PLATELETS Thousands/uL  --  154   NEUTROS PCT %  --  81*   LYMPHS PCT %  --  12*   MONOS PCT %  --  5   EOS PCT %  --  0     Results from last 7 days   Lab Units 09/22/22  0618   SODIUM mmol/L 140   POTASSIUM mmol/L 3 7   CHLORIDE mmol/L 107   CO2 mmol/L 27   BUN mg/dL 19   CREATININE mg/dL 0 73   ANION GAP mmol/L 6   CALCIUM mg/dL 7 7*   GLUCOSE RANDOM mg/dL 125     Results from last 7 days   Lab Units 09/20/22  1920   INR  1 07     Results from last 7 days   Lab Units 09/22/22  1328 09/22/22  1029 09/22/22  0758 09/22/22  0605 09/22/22  0509 09/22/22  0435 09/22/22  0402 09/22/22  0254 09/22/22  0028 09/21/22  2225 09/21/22  2050 09/21/22  1819   POC GLUCOSE mg/dl 100 106 121 141* 163* 68 67 84 83 97 122 107         Results from last 7 days   Lab Units 09/20/22  1920   LACTIC ACID mmol/L 1 1       Lines/Drains:  Invasive Devices  Report    Peripheral Intravenous Line  Duration           Peripheral IV 09/20/22 Left Antecubital 1 day          Drain  Duration           External Urinary Catheter 1 day                      Imaging: No pertinent imaging reviewed  Recent Cultures (last 7 days):   Results from last 7 days   Lab Units 09/20/22  2253   URINE CULTURE  Culture results to follow         Last 24 Hours Medication List:   Current Facility-Administered Medications   Medication Dose Route Frequency Provider Last Rate    cefTRIAXone  1,000 mg Intravenous Q24H Gabi Garcia MD 1,000 mg (09/22/22 1023)    DULoxetine  20 mg Oral Daily Kwasi Freed PA-C      enoxaparin  40 mg Subcutaneous Q24H [facility] Godfrey Nicholson MD      ferrous sulfate  325 mg Oral Daily With Breakfast Kwasi jacques PA-C      folic acid  1 mg Oral Daily Mayuri Fontana PA-C      gabapentin  300 mg Oral TID Mayuri Fontana PA-C      glucose  16 g Oral Once Christiana Villalba MD      lactated ringers  50 mL/hr Intravenous Continuous Elis Allen MD 50 mL/hr (09/22/22 1242)    levothyroxine  100 mcg Oral Early Morning [place] AdonaALAN      lidocaine (PF)  0 5 mL Infiltration Once PRN Elis Allen MD      oxybutynin  5 mg Oral Daily [place], [place]      pantoprazole  40 mg Intravenous Q12H [place], [place]      pravastatin  20 mg Oral Daily With [address], [place]      predniSONE  60 mg Oral Daily [place], [place]          Today, Patient Was Seen By: Ce Hood MD    **Please Note: This note may have been constructed using a voice recognition system  **

## 2022-09-22 NOTE — ASSESSMENT & PLAN NOTE
Patient reports development of lightheadedness at her care facility earlier today  Her SBP was found to be 70-90 at the facility and she was sent to the emergency department  Patient is without symptom/complaint at this time, although unsure of accuracy of ROS w/ possible demenita/schizophrenia baseline status      /70   Pulse 85   Temp 97 8 °F (36 6 °C)   Resp 19   Ht 5' 7" (1 702 m)   Wt 84 7 kg (186 lb 11 7 oz)   SpO2 98%   BMI 29 25 kg/m²     · SBP reportedly 70-90's at outpatient care facility w/ patient complaining of lightheadedness  · Was 93/49 on ED presentation  · Currently 122/56 after 1L IVF bolus  · Possible slow GI blood loss with anemia as below  · Patient without complaint at this time; however has questionable demenia/schizophrenia baseline mental status  · Hold home HTN medications  · Continuous gentle IVF hydration overnight  · Closely monitor BP  · Pt /92 this morning (9/21)

## 2022-09-22 NOTE — ASSESSMENT & PLAN NOTE
UA positive nitrites and leukocytes  No suprapubic tenderness or CVA on exam  Afebrile  WBC 7 85 and lactic acid 1 1 normal  Unclear if patient is symptomatic or not; unable to answer questions  Will treat appropriately with abx  · Ceftriaxone 1000mg started today (9/21)  · Urine cultures pending  · Monitor temperature     Patient mental status much improved and says she is feeling much better this morning (9/22)  Afebrile   WBC 8 46    · Continue with ceftriaxone 1000mg today and monitor any change in mental status, symptoms, vitals

## 2022-09-22 NOTE — ASSESSMENT & PLAN NOTE
Patient reports development of lightheadedness at her care facility earlier today  Her SBP was found to be 70-90 at the facility and she was sent to the emergency department  Patient is without symptom/complaint at this time, although unsure of accuracy of ROS w/ possible demenita/schizophrenia baseline status      /56   Pulse 80   Temp (!) 97 1 °F (36 2 °C) (Temporal)   Resp 18   Ht 5' 7" (1 702 m)   Wt 84 7 kg (186 lb 11 7 oz)   SpO2 98%   BMI 29 25 kg/m²     · SBP reportedly 70-90's at outpatient care facility w/ patient complaining of lightheadedness  · Likely volume depletion from UTI and poor PO intake  · Status post IV resuscitation  · Blood pressure now stable  · Monitor

## 2022-09-22 NOTE — QUICK NOTE
Called patient's POA, son Jan Leary, to inform him of the results of patient's EGD: 3 small AVMs in the stomach with mild oozing of blood which were treated with APC

## 2022-09-22 NOTE — ANESTHESIA PREPROCEDURE EVALUATION
Procedure:  EGD    Relevant Problems   CARDIO   (+) Hyperlipidemia   (+) Primary hypertension   (+) Temporal arteritis (HCC)      ENDO   (+) Hypothyroidism      /RENAL   (+) Kidney disorder      HEMATOLOGY   (+) Anemia      MUSCULOSKELETAL   (+) Osteoarthritis      NEURO/PSYCH   (+) Hemiplegia (HCC)   (+) Schizophrenia (HCC)      Cardiovascular and Mediastinum   (+) Hypotension      Musculoskeletal and Integument   (+) Multiple stable closed lateral compression fractures of pelvis (HCC)      Genitourinary   (+) Overactive bladder      Normal sinus rhythm  Right bundle branch block  Abnormal ECG  When compared with ECG of 20-SEP-2022 19:12, (unconfirmed)  Premature atrial complexes are no longer Present  Confirmed by Ana Maria Rodriguez ([id number]) on 9/21/2022 12:45:36 PM    Physical Exam    Airway    Mallampati score: II  TM Distance: >3 FB  Neck ROM: full     Dental       Cardiovascular  Cardiovascular exam normal    Pulmonary  Breath sounds clear to auscultation,     Other Findings        Anesthesia Plan  ASA Score- 3     Anesthesia Type- IV sedation with anesthesia with ASA Monitors  Additional Monitors:   Airway Plan:     Comment: Phone consent from patient's son, Hyacinth Fernández [phone]  Plan Factors-    Chart reviewed  EKG reviewed  Imaging results reviewed  Existing labs reviewed  Patient summary reviewed  Patient is not a current smoker  Patient instructed to abstain from smoking on day of procedure  Patient did not smoke on day of surgery  Obstructive sleep apnea risk education given perioperatively  Induction- intravenous  Postoperative Plan-     Informed Consent- Anesthetic plan and risks discussed with healthcare power of  and son  I personally reviewed this patient with the CRNA  Discussed and agreed on the Anesthesia Plan with the CRNA           Lab Results   Component Value Date    WBC 8 46 09/22/2022    HGB 9 8 (L) 09/22/2022    HCT 31 8 (L) 09/22/2022    MCV 96 09/22/2022     09/22/2022     Lab Results   Component Value Date    CALCIUM 7 7 (L) 09/22/2022    K 3 7 09/22/2022    CO2 27 09/22/2022     09/22/2022    BUN 19 09/22/2022    CREATININE 0 73 09/22/2022     Lab Results   Component Value Date    INR 1 07 09/20/2022    INR 1 03 05/27/2022    PROTIME 13 7 09/20/2022    PROTIME 13 1 05/27/2022     Lab Results   Component Value Date    PTT 29 09/20/2022           Discussed with pt the benefits/alternatives and risks or General Anesthesia including breathing tube remaining in place if not strong enough, PONV, damage to lips and teeth, sore throat, eye injury or blindness    I, Dr Sharif Finch, the attending physician, have personally seen and evaluated the patient prior to anesthetic care  I have reviewed the pre-anesthetic record, and other medical records if appropriate to the anesthetic care  If a CRNA is involved in the case, I have reviewed the CRNA assessment, if present, and agree  The patient is in a suitable condition to proceed with my formulated anesthetic plan

## 2022-09-23 VITALS
RESPIRATION RATE: 18 BRPM | SYSTOLIC BLOOD PRESSURE: 137 MMHG | HEIGHT: 67 IN | WEIGHT: 186.73 LBS | OXYGEN SATURATION: 98 % | DIASTOLIC BLOOD PRESSURE: 68 MMHG | HEART RATE: 96 BPM | TEMPERATURE: 98.8 F | BODY MASS INDEX: 29.31 KG/M2

## 2022-09-23 LAB
BACTERIA UR CULT: ABNORMAL
BACTERIA UR CULT: ABNORMAL
FLUAV RNA RESP QL NAA+PROBE: NEGATIVE
FLUBV RNA RESP QL NAA+PROBE: NEGATIVE
GLUCOSE SERPL-MCNC: 142 MG/DL (ref 65–140)
GLUCOSE SERPL-MCNC: 143 MG/DL (ref 65–140)
GLUCOSE SERPL-MCNC: 156 MG/DL (ref 65–140)
GLUCOSE SERPL-MCNC: 216 MG/DL (ref 65–140)
GLUCOSE SERPL-MCNC: 282 MG/DL (ref 65–140)
GLUCOSE SERPL-MCNC: 313 MG/DL (ref 65–140)
HCT VFR BLD AUTO: 29.8 % (ref 34.8–46.1)
HCT VFR BLD AUTO: 31.2 % (ref 34.8–46.1)
HGB BLD-MCNC: 9.2 G/DL (ref 11.5–15.4)
HGB BLD-MCNC: 9.6 G/DL (ref 11.5–15.4)
RSV RNA RESP QL NAA+PROBE: NEGATIVE
SARS-COV-2 RNA RESP QL NAA+PROBE: NEGATIVE

## 2022-09-23 PROCEDURE — 85018 HEMOGLOBIN: CPT | Performed by: INTERNAL MEDICINE

## 2022-09-23 PROCEDURE — C9113 INJ PANTOPRAZOLE SODIUM, VIA: HCPCS | Performed by: INTERNAL MEDICINE

## 2022-09-23 PROCEDURE — 99232 SBSQ HOSP IP/OBS MODERATE 35: CPT | Performed by: INTERNAL MEDICINE

## 2022-09-23 PROCEDURE — 0241U HB NFCT DS VIR RESP RNA 4 TRGT: CPT | Performed by: STUDENT IN AN ORGANIZED HEALTH CARE EDUCATION/TRAINING PROGRAM

## 2022-09-23 PROCEDURE — 82948 REAGENT STRIP/BLOOD GLUCOSE: CPT

## 2022-09-23 PROCEDURE — NC001 PR NO CHARGE: Performed by: PHYSICIAN ASSISTANT

## 2022-09-23 PROCEDURE — 85014 HEMATOCRIT: CPT | Performed by: INTERNAL MEDICINE

## 2022-09-23 PROCEDURE — 99239 HOSP IP/OBS DSCHRG MGMT >30: CPT | Performed by: STUDENT IN AN ORGANIZED HEALTH CARE EDUCATION/TRAINING PROGRAM

## 2022-09-23 RX ORDER — SULFAMETHOXAZOLE AND TRIMETHOPRIM 800; 160 MG/1; MG/1
1 TABLET ORAL EVERY 12 HOURS SCHEDULED
Qty: 8 TABLET | Refills: 0
Start: 2022-09-23 | End: 2022-09-27

## 2022-09-23 RX ADMIN — SODIUM CHLORIDE, SODIUM LACTATE, POTASSIUM CHLORIDE, AND CALCIUM CHLORIDE 50 ML/HR: .6; .31; .03; .02 INJECTION, SOLUTION INTRAVENOUS at 09:14

## 2022-09-23 RX ADMIN — ENOXAPARIN SODIUM 40 MG: 40 INJECTION SUBCUTANEOUS at 09:21

## 2022-09-23 RX ADMIN — PRAVASTATIN SODIUM 20 MG: 20 TABLET ORAL at 16:47

## 2022-09-23 RX ADMIN — PANTOPRAZOLE SODIUM 40 MG: 40 INJECTION, POWDER, FOR SOLUTION INTRAVENOUS at 09:21

## 2022-09-23 RX ADMIN — CEFTRIAXONE SODIUM 1000 MG: 10 INJECTION, POWDER, FOR SOLUTION INTRAVENOUS at 10:18

## 2022-09-23 RX ADMIN — PREDNISONE 60 MG: 20 TABLET ORAL at 09:21

## 2022-09-23 RX ADMIN — GABAPENTIN 300 MG: 300 CAPSULE ORAL at 09:21

## 2022-09-23 RX ADMIN — FERROUS SULFATE TAB 325 MG (65 MG ELEMENTAL FE) 325 MG: 325 (65 FE) TAB at 09:21

## 2022-09-23 RX ADMIN — GABAPENTIN 300 MG: 300 CAPSULE ORAL at 15:58

## 2022-09-23 RX ADMIN — LEVOTHYROXINE SODIUM 100 MCG: 100 TABLET ORAL at 09:21

## 2022-09-23 RX ADMIN — FOLIC ACID 1 MG: 1 TABLET ORAL at 09:21

## 2022-09-23 RX ADMIN — OXYBUTYNIN 5 MG: 5 TABLET, FILM COATED, EXTENDED RELEASE ORAL at 09:21

## 2022-09-23 RX ADMIN — DULOXETINE 20 MG: 20 CAPSULE, DELAYED RELEASE ORAL at 09:34

## 2022-09-23 NOTE — CASE MANAGEMENT
Case Management Discharge Planning Note    Patient name Rolfe Aase  Location [address] 322/-[id number] MRN [id number]  : 1933 Date 2022       Current Admission Date: 2022  Current Admission Diagnosis:Hypotension   Patient Active Problem List    Diagnosis Date Noted    Altered mental status 2022    Urinary tract infection 2022    Hypoglycemia 2022    Hypotension 2022    Anemia 2022    Hemiplegia ([facility] ) 2022    Hyperlipidemia 2022    Hypothyroidism 2022    Schizophrenia ([facility] ) 2022    Osteoarthritis 2022    Kidney disorder 2022    Primary hypertension 2022    Temporal arteritis ([facility] ) 2022    Overactive bladder 2022    Multiple stable closed lateral compression fractures of pelvis ([facility] ) 10/13/2021      LOS (days): 2  Geometric Mean LOS (GMLOS) (days): 3 00  Days to GMLOS:1 2     OBJECTIVE:  Risk of Unplanned Readmission Score: 14 13         Current admission status: Inpatient   Preferred Pharmacy:   [address] [facility], [address] [facility]  [address] [address]  Phone: [phone] Fax: [phone]    Primary Care Provider: Randy Baxter DO    Primary Insurance: MEDICARE  Secondary Insurance: [address] [place] [address] E    DISCHARGE DETAILS:    Discharge planning discussed with[de-identified] LMOVM for kimmy Bean and NELIA Medeiros  Freedom of Choice: Yes  Comments - Freedom of Choice: [facility]  Carola Royal in admissions confirms they can take her back with a negative COVID swab today  Test has been ordered and BLS has been requested via Roundtrip  CMN completed and placed on chart    CM contacted family/caregiver?: Yes (LMOVM for both contacts)     Did patient/caregiver verbalize understanding of patient care needs?: N/A- going to facility       Contacts  Patient Contacts: kimmy Bean, NELIA Leigha  Relationship to Patient[de-identified] Family  Contact Method: Phone (LMOVM for both)  Phone Number: [phone], [phone]  Reason/Outcome: Continuity of Care, Discharge Planning, Referral    Other Referral/Resources/Interventions Provided:  Interventions: Transportation     Treatment Team Recommendation: SNF  Discharge Destination Plan[de-identified] SNF ([address] [place] [address])  Transport at Discharge : [facility]  Dispatcher Contacted: Yes (requested via Roundtrip)  Number/Name of Dispatcher: [employer] by Ruth and Unit #): TBD  ETA of Transport (Date): 09/23/22        Transfer Mode: Stretcher  Accompanied by: EMS personnel  Transfer Equipment: BLS devices

## 2022-09-23 NOTE — ASSESSMENT & PLAN NOTE
· UA +, urine culture with non specific findings  · Status post 3 days of IV antibiotics  · Stable for discharge on PO antibiotics

## 2022-09-23 NOTE — ASSESSMENT & PLAN NOTE
· Likely UTI exacerbating dementia  · Currently improving with supportive care  · AAOx2   · AMS resolved

## 2022-09-23 NOTE — PROGRESS NOTES
Progress Note  Radha Alan 80 y o  female MRN: [id number]  Unit/Bed#: -01 Encounter: [id number]    Subjective:  Patient denies any nausea or abdominal pain  No reports of melena or rectal bleeding per discussion with patient's nurse  Objective:     Vitals:   Vitals:    09/23/22 0734   BP: 136/65   Pulse:    Resp: 18   Temp: 98 4 °F (36 9 °C)   SpO2:    ,Body mass index is 29 25 kg/m²        Intake/Output Summary (Last 24 hours) at 9/23/2022 0918  Last data filed at 9/23/2022 0914  Gross per 24 hour   Intake 1440 ml   Output 1400 ml   Net 40 ml       Physical Exam:     General Appearance: Alert, appears stated age and cooperative  Lungs: Clear to auscultation bilaterally, no rales or rhonchi, no labored breathing/accessory muscle use  Heart: Regular rate and rhythm, S1, S2 normal, no murmur, click, rub or gallop  Abdomen: Soft, non-tender, non-distended; bowel sounds normal; no masses or no organomegaly  Extremities: No cyanosis    Invasive Devices  Report    Peripheral Intravenous Line  Duration           Peripheral IV 09/20/22 Left Antecubital 2 days          Drain  Duration           External Urinary Catheter 2 days                Lab Results:  Admission on 09/20/2022   Component Date Value    WBC 09/20/2022 7 85     RBC 09/20/2022 3 11 (A)    Hemoglobin 09/20/2022 9 2 (A)    Hematocrit 09/20/2022 29 7 (A)    MCV 09/20/2022 96     MCH 09/20/2022 29 6     MCHC 09/20/2022 31 0 (A)    RDW 09/20/2022 17 1 (A)    MPV 09/20/2022 11 1     Platelets 38/52/8916 165     nRBC 09/20/2022 2     Neutrophils Relative 09/20/2022 79 (A)    Immat GRANS % 09/20/2022 3 (A)    Lymphocytes Relative 09/20/2022 13 (A)    Monocytes Relative 09/20/2022 5     Eosinophils Relative 09/20/2022 0     Basophils Relative 09/20/2022 0     Neutrophils Absolute 09/20/2022 6 14     Immature Grans Absolute 09/20/2022 0 23 (A)    Lymphocytes Absolute 09/20/2022 1 05     Monocytes Absolute 09/20/2022 0 41     Eosinophils Absolute 09/20/2022 0 01     Basophils Absolute 09/20/2022 0 01     Protime 09/20/2022 13 7     INR 09/20/2022 1 07     PTT 09/20/2022 29     LACTIC ACID 09/20/2022 1 1     hs TnI 0hr 09/20/2022 28     Color, UA 09/20/2022 Yellow     Clarity, UA 09/20/2022 Turbid     Specific Gravity, UA 09/20/2022 1 020     pH, UA 09/20/2022 7 0     Leukocytes, UA 09/20/2022 Moderate (A)    Nitrite, UA 09/20/2022 Positive (A)    Protein, UA 09/20/2022 30 (1+) (A)    Glucose, UA 09/20/2022 Negative     Ketones, UA 09/20/2022 Negative     Urobilinogen, UA 09/20/2022 1 0     Bilirubin, UA 09/20/2022 Negative     Occult Blood, UA 09/20/2022 Moderate (A)    SARS-CoV-2 09/20/2022 Negative     INFLUENZA A PCR 09/20/2022 Negative     INFLUENZA B PCR 09/20/2022 Negative     RSV PCR 09/20/2022 Negative     Ventricular Rate 09/20/2022 77     Atrial Rate 09/20/2022 77     OH Interval 09/20/2022 150     QRSD Interval 09/20/2022 142     QT Interval 09/20/2022 450     QTC Interval 09/20/2022 509     P Axis 09/20/2022 65     QRS Axis 09/20/2022 -1     T Wave Axis 09/20/2022 48     hs TnI 2hr 09/20/2022 24     Delta 2hr hsTnI 09/20/2022 -4     Sodium 09/20/2022 142     Potassium 09/20/2022 3 6     Chloride 09/20/2022 106     CO2 09/20/2022 28     ANION GAP 09/20/2022 8     BUN 09/20/2022 39 (A)    Creatinine 09/20/2022 0 74     Glucose 09/20/2022 32 (A)    Calcium 09/20/2022 8 1 (A)    eGFR 09/20/2022 72     Hemoglobin 09/20/2022 9 1 (A)    Hematocrit 09/20/2022 29 5 (A)    Ventricular Rate 09/20/2022 72     Atrial Rate 09/20/2022 72     OH Interval 09/20/2022 156     QRSD Interval 09/20/2022 146     QT Interval 09/20/2022 474     QTC Interval 09/20/2022 519     P Axis 09/20/2022 69     QRS Axis 09/20/2022 -7     T Wave Axis 09/20/2022 49     RBC, UA 09/20/2022 Field obscured, unable to enumerate (A)    WBC, UA 09/20/2022 30-50 (A)    Epithelial Cells 09/20/2022 Field obscured, unable to enumerate (A)    Bacteria, UA 09/20/2022 Field obscured, unable to enumerate (A)    Urine Culture 09/20/2022 Culture results to follow       POC Glucose 09/21/2022 35 (A)    POC Glucose 09/21/2022 120     POC Glucose 09/21/2022 90     Sodium 09/21/2022 140     Potassium 09/21/2022 4 2     Chloride 09/21/2022 104     CO2 09/21/2022 29     ANION GAP 09/21/2022 7     BUN 09/21/2022 36 (A)    Creatinine 09/21/2022 0 77     Glucose 09/21/2022 81     Glucose, Fasting 09/21/2022 81     Calcium 09/21/2022 8 7     eGFR 09/21/2022 68     Hemoglobin 09/21/2022 10 2 (A)    Hematocrit 09/21/2022 33 3 (A)    POC Glucose 09/21/2022 75     POC Glucose 09/21/2022 83     POC Glucose 09/21/2022 112     Hemoglobin 09/21/2022 10 7 (A)    Hematocrit 09/21/2022 35 6     POC Glucose 09/21/2022 96     POC Glucose 09/21/2022 107     POC Glucose 09/21/2022 122     POC Glucose 09/21/2022 97     POC Glucose 09/22/2022 83     POC Glucose 09/22/2022 84     WBC 09/22/2022 8 46     RBC 09/22/2022 3 12 (A)    Hemoglobin 09/22/2022 9 0 (A)    Hematocrit 09/22/2022 29 8 (A)    MCV 09/22/2022 96     MCH 09/22/2022 28 8     MCHC 09/22/2022 30 2 (A)    RDW 09/22/2022 16 5 (A)    MPV 09/22/2022 9 8     Platelets 90/23/6985 154     nRBC 09/22/2022 1     Neutrophils Relative 09/22/2022 81 (A)    Immat GRANS % 09/22/2022 2     Lymphocytes Relative 09/22/2022 12 (A)    Monocytes Relative 09/22/2022 5     Eosinophils Relative 09/22/2022 0     Basophils Relative 09/22/2022 0     Neutrophils Absolute 09/22/2022 6 91     Immature Grans Absolute 09/22/2022 0 14     Lymphocytes Absolute 09/22/2022 1 02     Monocytes Absolute 09/22/2022 0 38     Eosinophils Absolute 09/22/2022 0 00     Basophils Absolute 09/22/2022 0 01     Sodium 09/22/2022 140     Potassium 09/22/2022 3 7     Chloride 09/22/2022 107     CO2 09/22/2022 27     ANION GAP 09/22/2022 6     BUN 09/22/2022 19     Creatinine 09/22/2022 0 73     Glucose 09/22/2022 125     Calcium 09/22/2022 7 7 (A)    eGFR 09/22/2022 73     Hemoglobin 09/22/2022 9 8 (A)    Hematocrit 09/22/2022 31 8 (A)    POC Glucose 09/22/2022 67     POC Glucose 09/22/2022 68     POC Glucose 09/22/2022 163 (A)    POC Glucose 09/22/2022 141 (A)    POC Glucose 09/22/2022 121     POC Glucose 09/22/2022 106     Hemoglobin 09/22/2022 10 5 (A)    Hematocrit 09/22/2022 34 6 (A)    POC Glucose 09/22/2022 100     POC Glucose 09/22/2022 108     POC Glucose 09/22/2022 230 (A)    Hemoglobin 09/23/2022 9 2 (A)    Hematocrit 09/23/2022 29 8 (A)    POC Glucose 09/22/2022 350 (A)    POC Glucose 09/23/2022 282 (A)    Hemoglobin 09/23/2022 9 6 (A)    Hematocrit 09/23/2022 31 2 (A)    POC Glucose 09/23/2022 156 (A)    POC Glucose 09/23/2022 143 (A)       Imaging Studies:   I have personally reviewed pertinent imaging studies  EGD    Result Date: 9/22/2022  Narrative:  [address] [place] [address] Endoscopy [address] [place] [id number] [phone] DATE OF SERVICE: 9/22/22 PHYSICIAN(S): Attending: Anuradha Bennett DO Fellow: No Staff Documented INDICATION: Anemia POST-OP DIAGNOSIS: See the impression below  PREPROCEDURE: Informed consent was obtained for the procedure, including sedation  Risks of perforation, hemorrhage, adverse drug reaction and aspiration were discussed  The patient was placed in the left lateral decubitus position  Patient was explained about the risks and benefits of the procedure  Risks including but not limited to bleeding, infection, and perforation were explained in detail  Also explained about less than 100% sensitivity with the exam and other alternatives  DETAILS OF PROCEDURE: Patient was taken to the procedure room where a time out was performed to confirm correct patient and correct procedure   The patient underwent monitored anesthesia care, which was administered by an anesthesia professional  The patient's blood pressure, heart rate, level of consciousness, respirations and oxygen were monitored throughout the procedure  The scope was advanced to the second part of the duodenum  Retroflexion was performed in the fundus  The patient's estimated blood loss was minimal (<5 mL)  The procedure was not difficult  The patient tolerated the procedure well  There were no apparent complications  ANESTHESIA INFORMATION: ASA: III Anesthesia Type: IV Sedation with Anesthesia MEDICATIONS: No administrations occurring from 1242 to 1301 on 09/22/22 FINDINGS: All observed locations appeared normal, including the cricopharynx, upper third of the esophagus, middle third of the esophagus, lower third of the esophagus, GE junction and Z-line  Z-line is 40 cm from the incisors  Multiple small angioectasias in the body of the stomach with bleeding; tissue ablated completely with argon plasma coagulation The antrum, prepyloric region and pylorus appeared normal  The duodenal bulb and 2nd part of the duodenum appeared normal  SPECIMENS: * No specimens in log *     Impression: 1  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 RECOMMENDATION:  1  Resume previous diet   Wilton Zambrano, DO [place], [place]     XR chest 2 views    Result Date: 9/21/2022  Narrative: CHEST INDICATION:   hypotension, lightheaded r/o pneumonia vs obstructive etiology such as ptx/effusion  COMPARISON:  None EXAM PERFORMED/VIEWS:  XR CHEST PA & LATERAL Images: 2 FINDINGS: Cardiomediastinal silhouette appears enlarged  The lungs are clear  No pneumothorax or pleural effusion  Severe arthritic changes in the shoulders  Impression: No acute cardiopulmonary disease  Cardiomegaly  Workstation performed: [id number]         Assessment & Plan    Heme positive anemia  Gastric AVMs s/p APC    - Patient presented with lightheadedness/AMS  She was admitted with a heme positive anemia and a UTI   - Rectal exam in ED on admission showed black stool and was heme positive    - Per review of labs, in May, HGB was 12 4 and on admission was 9 2   - She underwent EGD yesterday 3 small AVMs in the stomach with mild oozing of blood which were treated with APC  - I spoke to patient's son by phone yesterday after the procedure to inform his of this findings   - HGB this am is stable at 9 6 and no reports of melena/rectal bleeding per discussion with patient's nurse  - Continue to monitor/trend CBC  Supportive care  The patient will be seen by Dr Audelia Simmonds    GI will sign off, please contact with any questions/concerns, reconsult prn     Leidy Humphrey PA-C  9/23/2022,9:18 AM

## 2022-09-23 NOTE — NURSING NOTE
Patient report given to Tracie Kate from [facility]  Per request by Tracie Kate: patient AVS was faxed to facility

## 2022-09-23 NOTE — DISCHARGE SUMMARY
[address]  Discharge- Adrián Panda 1933, 80 y o  female MRN: [id number]  Unit/Bed#: -01 Encounter: [id number]  Primary Care Provider: Arabella Castleman, DO   Date and time admitted to hospital: 9/20/2022  6:51 PM    * Hypotension  Assessment & Plan  Patient reports development of lightheadedness at her care facility earlier today  Her SBP was found to be 70-90 at the facility and she was sent to the emergency department  Patient is without symptom/complaint at this time, although unsure of accuracy of ROS w/ possible demenita/schizophrenia baseline status  /65   Pulse (!) 109   Temp 98 4 °F (36 9 °C)   Resp 18   Ht 5' 7" (1 702 m)   Wt 84 7 kg (186 lb 11 7 oz)   SpO2 98%   BMI 29 25 kg/m²     · SBP reportedly 70-90's at outpatient care facility w/ patient complaining of lightheadedness  · Likely volume depletion from UTI and poor PO intake  · Status post IV resuscitation  · Blood pressure now stable  · Monitor as an outpatient     Altered mental status  Assessment & Plan  · Likely UTI exacerbating dementia  · Currently improving with supportive care  · AAOx2   · AMS resolved     Anemia  Assessment & Plan  · Acute on chronic anemia   · GI consulted, status post EGD  · Now stable, hemoglobin mildly below baseline  · Can monitor at home facility    Hypoglycemia  Assessment & Plan  Patient experienced episode of hypoglycemia 35mg/dl around midnight last night  Patient given D50 bolus and D5 added to IVF infusion   Could be due to poor oral intake because of UTI  · Now stabilizing with antibiotics and D5 fluids  · Monitor off fluids   · Stable for discharge     Urinary tract infection  Assessment & Plan  · UA +, urine culture with non specific findings  · Status post 3 days of IV antibiotics  · Stable for discharge on PO antibiotics     Hemiplegia (HCC)  Assessment & Plan  · Left-sided  · 2/2 prior CVA  · Continue home daily plavix, statin, and gabapentin       Medical Problems             Resolved Problems  Date Reviewed: 9/22/2022   None               Discharging Physician / Practitioner: Tariq Schmidt MD  PCP: Naye Burger DO  Admission Date:   Admission Orders (From admission, onward)     Ordered        09/21/22 1813  Inpatient Admission  Once            09/20/22 2155  Place in Observation  Once                      Discharge Date: 09/23/22    Consultations During Hospital Stay:  · IP CONSULT TO GASTROENTEROLOGY  ·     Procedures Performed:   · imaging    Significant Findings / Test Results:   Principal Problem:    Hypotension  Active Problems:    Altered mental status    Anemia    Hemiplegia ([facility] )    Hyperlipidemia    Hypothyroidism    Schizophrenia ([facility] )    Urinary tract infection    Hypoglycemia  Resolved Problems:    * No resolved hospital problems  *  ·   ·     Incidental Findings:   · See above      Test Results Pending at Discharge (will require follow up): · Na      Outpatient Tests Requested:  · Follow up with PCP     Complications:  None     Reason for Admission: altered mental status     Hospital Course:   Rain Mobley is a 80 y o  female patient who originally presented to the hospital on 9/20/2022 due to altered mental status, hypotension, hypoglycemia in setting of UTI with poor PO intake  Now resolving with  IV fluids  Stable for discharge  Condition at Discharge: fair    Discharge Day Visit / Exam:   * Please refer to separate progress note for these details *    Discussion with Family: Attempted to update  (son) via phone  Unable to contact  Discharge instructions/Information to patient and family:   See after visit summary for information provided to patient and family  Provisions for Follow-Up Care:  See after visit summary for information related to follow-up care and any pertinent home health orders         Disposition:   Other: Back to home facility     Planned Readmission: none      Discharge Statement:  I spent 30+ minutes discharging the patient  This time was spent on the day of discharge  I had direct contact with the patient on the day of discharge  Greater than 50% of the total time was spent examining patient, answering all patient questions, arranging and discussing plan of care with patient as well as directly providing post-discharge instructions  Additional time then spent on discharge activities  Discharge Medications:  See after visit summary for reconciled discharge medications provided to patient and/or family        **Please Note: This note may have been constructed using a voice recognition system**

## 2022-09-23 NOTE — PLAN OF CARE
Problem: Prexisting or High Potential for Compromised Skin Integrity  Goal: Skin integrity is maintained or improved  Description: INTERVENTIONS:  - Identify patients at risk for skin breakdown  - Assess and monitor skin integrity  - Assess and monitor nutrition and hydration status  - Monitor labs   - Assess for incontinence   - Turn and reposition patient  - Assist with mobility/ambulation  - Relieve pressure over bony prominences  - Avoid friction and shearing  - Provide appropriate hygiene as needed including keeping skin clean and dry  - Evaluate need for skin moisturizer/barrier cream  - Collaborate with interdisciplinary team   - Patient/family teaching  - Consider wound care consult   Outcome: Progressing   Prophylactic allevyn placed on sacrum   Heel boots on bilateral heels

## 2022-09-23 NOTE — CASE MANAGEMENT
Case Management Discharge Planning Note    Patient name Vaishnavi Ohara  Location [address] 322/-[id number] MRN [id number]  : 1933 Date 2022       Current Admission Date: 2022  Current Admission Diagnosis:Hypotension   Patient Active Problem List    Diagnosis Date Noted    Altered mental status 2022    Urinary tract infection 2022    Hypoglycemia 2022    Hypotension 2022    Anemia 2022    Hemiplegia ([facility] ) 2022    Hyperlipidemia 2022    Hypothyroidism 2022    Schizophrenia ([facility] ) 2022    Osteoarthritis 2022    Kidney disorder 2022    Primary hypertension 2022    Temporal arteritis ([facility] ) 2022    Overactive bladder 2022    Multiple stable closed lateral compression fractures of pelvis ([facility] ) 10/13/2021      LOS (days): 2  Geometric Mean LOS (GMLOS) (days): 4 40  Days to GMLOS:2 5     OBJECTIVE:  Risk of Unplanned Readmission Score: 14 13         Current admission status: Inpatient   Preferred Pharmacy:   [address] [facility], [address] [facility]  [address] [address]  Phone: [phone] Fax: [phone]    Primary Care Provider: Tara Ferrell DO    Primary Insurance: MEDICARE  Secondary Insurance: Thedora Gene    DISCHARGE DETAILS:    Discharge planning discussed with[de-identified] kimmy Shaikh via phone  Freedom of Choice: Yes  Comments - Freedom of Choice: CM received TT from [facility]; BLS has been arranged with [facility] at 2 pm  CARLOS, CATALINA, [facility] and kimmy Shaikh all made aware of discharge time    CM contacted family/caregiver?: Yes  Were Treatment Team discharge recommendations reviewed with patient/caregiver?: Yes  Did patient/caregiver verbalize understanding of patient care needs?: N/A- going to facility  Were patient/caregiver advised of the risks associated with not following Treatment Team discharge recommendations?: Yes    Contacts  Patient Contacts: son Aubree Eubanks  Relationship to Patient[de-identified] Family  Contact Method: Phone  Phone Number: [phone]  Reason/Outcome: Continuity of Care, Discharge Planning    Other Referral/Resources/Interventions Provided:  Interventions: Transportation  Referral Comments: CMN on chart  Treatment Team Recommendation: SNF  Discharge Destination Plan[de-identified] SNF (PV)  Transport at Discharge : [facility]  Dispatcher Contacted: Yes  Number/Name of Dispatcher: [facility]  Transported by [employer] and Unit #):  [facility]  ETA of Transport (Date): 09/23/22  ETA of Transport (Time): 1615     Transfer Mode: Stretcher  Accompanied by: EMS personnel  Transfer Equipment: S devices

## 2022-09-23 NOTE — ASSESSMENT & PLAN NOTE
Patient reports development of lightheadedness at her care facility earlier today  Her SBP was found to be 70-90 at the facility and she was sent to the emergency department  Patient is without symptom/complaint at this time, although unsure of accuracy of ROS w/ possible demenita/schizophrenia baseline status      /65   Pulse (!) 109   Temp 98 4 °F (36 9 °C)   Resp 18   Ht 5' 7" (1 702 m)   Wt 84 7 kg (186 lb 11 7 oz)   SpO2 98%   BMI 29 25 kg/m²     · SBP reportedly 70-90's at outpatient care facility w/ patient complaining of lightheadedness  · Likely volume depletion from UTI and poor PO intake  · Status post IV resuscitation  · Blood pressure now stable  · Monitor as an outpatient

## 2022-09-23 NOTE — ASSESSMENT & PLAN NOTE
Patient experienced episode of hypoglycemia 35mg/dl around midnight last night  Patient given D50 bolus and D5 added to IVF infusion   Could be due to poor oral intake because of UTI  · Now stabilizing with antibiotics and D5 fluids  · Monitor off fluids   · Stable for discharge

## 2022-09-23 NOTE — ASSESSMENT & PLAN NOTE
· Acute on chronic anemia   · GI consulted, status post EGD  · Now stable, hemoglobin mildly below baseline  · Can monitor at home facility

## (undated) DEVICE — LIGACLIP MCA MULTIPLE CLIP APPLIERS, 20 SMALL CLIPS: Brand: LIGACLIP

## (undated) DEVICE — INTENDED FOR TISSUE SEPARATION, AND OTHER PROCEDURES THAT REQUIRE A SHARP SURGICAL BLADE TO PUNCTURE OR CUT.: Brand: BARD-PARKER SAFETY BLADES SIZE 15, STERILE

## (undated) DEVICE — ADHESIVE SKIN HIGH VISCOSITY EXOFIN 1ML

## (undated) DEVICE — TELFA NON-ADHERENT ABSORBENT DRESSING: Brand: TELFA

## (undated) DEVICE — SUT VICRYL 4-0 P-3 18 IN J494G

## (undated) DEVICE — POV-IOD SWAB STICKS

## (undated) DEVICE — PENCIL ELECTROSURG E-Z CLEAN -0035H

## (undated) DEVICE — DRAPE SHEET THREE QUARTER

## (undated) DEVICE — TUBING SUCTION 5MM X 12 FT

## (undated) DEVICE — BETHLEHEM UNIVERSAL OUTPATIENT: Brand: CARDINAL HEALTH

## (undated) DEVICE — GLOVE SRG BIOGEL 6

## (undated) DEVICE — 3M™ STERI-STRIP™ REINFORCED ADHESIVE SKIN CLOSURES, R1542, 1/4 IN X 1-1/2 IN (6 MM X 38 MM), 6 STRIPS/ENVELOPE: Brand: 3M™ STERI-STRIP™

## (undated) DEVICE — 3M™ IOBAN™ 2 ANTIMICROBIAL INCISE DRAPE 6640EZ: Brand: IOBAN™ 2

## (undated) DEVICE — SUT SILK 3-0 18 IN A184H

## (undated) DEVICE — NEEDLE 25G X 1 1/2

## (undated) DEVICE — SUT SILK 4-0 18 IN A183H